# Patient Record
Sex: MALE | Race: BLACK OR AFRICAN AMERICAN | NOT HISPANIC OR LATINO | Employment: UNEMPLOYED | URBAN - METROPOLITAN AREA
[De-identification: names, ages, dates, MRNs, and addresses within clinical notes are randomized per-mention and may not be internally consistent; named-entity substitution may affect disease eponyms.]

---

## 2018-02-10 ENCOUNTER — HOSPITAL ENCOUNTER (INPATIENT)
Facility: HOSPITAL | Age: 43
LOS: 2 days | Discharge: HOME | DRG: 638 | End: 2018-02-12
Attending: HOSPITALIST | Admitting: HOSPITALIST
Payer: MEDICAID

## 2018-02-10 ENCOUNTER — HOSPITAL ENCOUNTER (EMERGENCY)
Facility: OTHER | Age: 43
Discharge: ANOTHER HOSPITAL | End: 2018-02-10
Attending: EMERGENCY MEDICINE
Payer: MEDICAID

## 2018-02-10 VITALS
BODY MASS INDEX: 36.1 KG/M2 | WEIGHT: 230 LBS | TEMPERATURE: 98 F | SYSTOLIC BLOOD PRESSURE: 161 MMHG | RESPIRATION RATE: 18 BRPM | HEART RATE: 106 BPM | OXYGEN SATURATION: 98 % | DIASTOLIC BLOOD PRESSURE: 97 MMHG | HEIGHT: 67 IN

## 2018-02-10 DIAGNOSIS — E11.10 DKA (DIABETIC KETOACIDOSES): ICD-10-CM

## 2018-02-10 DIAGNOSIS — E11.9 DIABETES MELLITUS, NEW ONSET: ICD-10-CM

## 2018-02-10 DIAGNOSIS — R30.0 DYSURIA: ICD-10-CM

## 2018-02-10 DIAGNOSIS — E13.10 DIABETIC KETOACIDOSIS WITHOUT COMA ASSOCIATED WITH OTHER SPECIFIED DIABETES MELLITUS: Primary | ICD-10-CM

## 2018-02-10 DIAGNOSIS — E11.10: ICD-10-CM

## 2018-02-10 DIAGNOSIS — R10.9 ABDOMINAL PAIN: ICD-10-CM

## 2018-02-10 PROBLEM — E83.52 HYPERCALCEMIA: Status: ACTIVE | Noted: 2018-02-10

## 2018-02-10 PROBLEM — N17.9 AKI (ACUTE KIDNEY INJURY): Status: ACTIVE | Noted: 2018-02-10

## 2018-02-10 PROBLEM — F17.200 TOBACCO USE DISORDER: Status: ACTIVE | Noted: 2018-02-10

## 2018-02-10 LAB
ALBUMIN SERPL BCP-MCNC: 3 G/DL
ALBUMIN SERPL-MCNC: 4.4 G/DL (ref 3.3–5.5)
ALP SERPL-CCNC: 77 U/L
ALP SERPL-CCNC: 99 U/L (ref 42–141)
ALT SERPL W/O P-5'-P-CCNC: 21 U/L
ANION GAP SERPL CALC-SCNC: 10 MMOL/L
ANION GAP SERPL CALC-SCNC: 12 MMOL/L
AST SERPL-CCNC: 9 U/L
BASOPHILS # BLD AUTO: 0.04 K/UL
BASOPHILS NFR BLD: 0.5 %
BILIRUB DIRECT SERPL-MCNC: 0.2 MG/DL
BILIRUB SERPL-MCNC: 0.5 MG/DL
BILIRUB SERPL-MCNC: 1 MG/DL (ref 0.2–1.6)
BILIRUBIN, POC UA: ABNORMAL
BLOOD, POC UA: ABNORMAL
BUN SERPL-MCNC: 12 MG/DL
BUN SERPL-MCNC: 15 MG/DL
BUN SERPL-MCNC: 18 MG/DL (ref 7–22)
CALCIUM SERPL-MCNC: 10.6 MG/DL (ref 8–10.3)
CALCIUM SERPL-MCNC: 8 MG/DL
CALCIUM SERPL-MCNC: 8.1 MG/DL
CHLORIDE SERPL-SCNC: 106 MMOL/L
CHLORIDE SERPL-SCNC: 107 MMOL/L
CHLORIDE SERPL-SCNC: 90 MMOL/L (ref 98–108)
CHOLEST SERPL-MCNC: 215 MG/DL
CHOLEST/HDLC SERPL: 8 {RATIO}
CLARITY, POC UA: CLEAR
CO2 SERPL-SCNC: 17 MMOL/L
CO2 SERPL-SCNC: 19 MMOL/L
COLOR, POC UA: YELLOW
CREAT SERPL-MCNC: 1.1 MG/DL
CREAT SERPL-MCNC: 1.2 MG/DL
CREAT SERPL-MCNC: 1.3 MG/DL (ref 0.6–1.2)
DIFFERENTIAL METHOD: ABNORMAL
EOSINOPHIL # BLD AUTO: 0 K/UL
EOSINOPHIL NFR BLD: 0.5 %
ERYTHROCYTE [DISTWIDTH] IN BLOOD BY AUTOMATED COUNT: 12.1 %
EST. GFR  (AFRICAN AMERICAN): >60 ML/MIN/1.73 M^2
EST. GFR  (AFRICAN AMERICAN): >60 ML/MIN/1.73 M^2
EST. GFR  (NON AFRICAN AMERICAN): >60 ML/MIN/1.73 M^2
EST. GFR  (NON AFRICAN AMERICAN): >60 ML/MIN/1.73 M^2
GLUCOSE SERPL-MCNC: 200 MG/DL
GLUCOSE SERPL-MCNC: 243 MG/DL
GLUCOSE SERPL-MCNC: 432 MG/DL (ref 73–118)
GLUCOSE, POC UA: ABNORMAL
HCO3 UR-SCNC: 21 MMOL/L (ref 24–28)
HCT VFR BLD AUTO: 38.3 %
HDLC SERPL-MCNC: 27 MG/DL
HDLC SERPL: 12.6 %
HGB BLD-MCNC: 13.5 G/DL
KETONES, POC UA: ABNORMAL
LDH SERPL L TO P-CCNC: 3.22 MMOL/L (ref 0.5–2.2)
LDLC SERPL CALC-MCNC: ABNORMAL MG/DL
LEUKOCYTE EST, POC UA: NEGATIVE
LYMPHOCYTES # BLD AUTO: 2.2 K/UL
LYMPHOCYTES NFR BLD: 29.1 %
MAGNESIUM SERPL-MCNC: 1.5 MG/DL
MCH RBC QN AUTO: 30.9 PG
MCHC RBC AUTO-ENTMCNC: 35.2 G/DL
MCV RBC AUTO: 88 FL
MONOCYTES # BLD AUTO: 0.6 K/UL
MONOCYTES NFR BLD: 7.5 %
NEUTROPHILS # BLD AUTO: 4.6 K/UL
NEUTROPHILS NFR BLD: 62 %
NITRITE, POC UA: NEGATIVE
NONHDLC SERPL-MCNC: 188 MG/DL
PCO2 BLDA: 44.2 MMHG (ref 35–45)
PH SMN: 7.28 [PH] (ref 7.35–7.45)
PH UR STRIP: 5 [PH]
PHOSPHATE SERPL-MCNC: 2.6 MG/DL
PLATELET # BLD AUTO: 262 K/UL
PMV BLD AUTO: 10.4 FL
PO2 BLDA: 32 MMHG (ref 40–60)
POC ALT (SGPT): 35 U/L (ref 10–47)
POC AST (SGOT): 22 U/L (ref 11–38)
POC BE: -6 MMOL/L
POC CARDIAC TROPONIN I: 0 NG/ML
POC CARDIAC TROPONIN I: 0.01 NG/ML
POC SATURATED O2: 53 % (ref 95–100)
POC TCO2: 20 MMOL/L (ref 18–33)
POC TCO2: 22 MMOL/L (ref 24–29)
POCT GLUCOSE: 194 MG/DL (ref 70–110)
POCT GLUCOSE: 224 MG/DL (ref 70–110)
POCT GLUCOSE: 237 MG/DL (ref 70–110)
POCT GLUCOSE: 245 MG/DL (ref 70–110)
POCT GLUCOSE: 247 MG/DL (ref 70–110)
POCT GLUCOSE: 251 MG/DL (ref 70–110)
POCT GLUCOSE: 253 MG/DL (ref 70–110)
POCT GLUCOSE: 281 MG/DL (ref 70–110)
POTASSIUM BLD-SCNC: 4.4 MMOL/L (ref 3.6–5.1)
POTASSIUM SERPL-SCNC: 3.8 MMOL/L
POTASSIUM SERPL-SCNC: 4.2 MMOL/L
PROT SERPL-MCNC: 5.9 G/DL
PROTEIN, POC UA: ABNORMAL
PROTEIN, POC: 8.8 G/DL (ref 6.4–8.1)
RBC # BLD AUTO: 4.37 M/UL
SAMPLE: ABNORMAL
SAMPLE: NORMAL
SAMPLE: NORMAL
SODIUM BLD-SCNC: 142 MMOL/L (ref 128–145)
SODIUM SERPL-SCNC: 135 MMOL/L
SODIUM SERPL-SCNC: 136 MMOL/L
SPECIFIC GRAVITY, POC UA: 1.02
TRIGL SERPL-MCNC: 572 MG/DL
TSH SERPL DL<=0.005 MIU/L-ACNC: 0.63 UIU/ML
UROBILINOGEN, POC UA: 0.2 E.U./DL
WBC # BLD AUTO: 7.42 K/UL

## 2018-02-10 PROCEDURE — 20000000 HC ICU ROOM

## 2018-02-10 PROCEDURE — 93005 ELECTROCARDIOGRAM TRACING: CPT

## 2018-02-10 PROCEDURE — 80061 LIPID PANEL: CPT

## 2018-02-10 PROCEDURE — 25000003 PHARM REV CODE 250: Performed by: HOSPITALIST

## 2018-02-10 PROCEDURE — 82803 BLOOD GASES ANY COMBINATION: CPT

## 2018-02-10 PROCEDURE — 90686 IIV4 VACC NO PRSV 0.5 ML IM: CPT | Performed by: HOSPITALIST

## 2018-02-10 PROCEDURE — 83036 HEMOGLOBIN GLYCOSYLATED A1C: CPT

## 2018-02-10 PROCEDURE — 83735 ASSAY OF MAGNESIUM: CPT

## 2018-02-10 PROCEDURE — S4991 NICOTINE PATCH NONLEGEND: HCPCS | Performed by: HOSPITALIST

## 2018-02-10 PROCEDURE — 63600175 PHARM REV CODE 636 W HCPCS: Performed by: HOSPITALIST

## 2018-02-10 PROCEDURE — 84443 ASSAY THYROID STIM HORMONE: CPT

## 2018-02-10 PROCEDURE — 25000003 PHARM REV CODE 250: Performed by: INTERNAL MEDICINE

## 2018-02-10 PROCEDURE — 96375 TX/PRO/DX INJ NEW DRUG ADDON: CPT

## 2018-02-10 PROCEDURE — 25000003 PHARM REV CODE 250: Performed by: EMERGENCY MEDICINE

## 2018-02-10 PROCEDURE — 90472 IMMUNIZATION ADMIN EACH ADD: CPT | Performed by: HOSPITALIST

## 2018-02-10 PROCEDURE — 84100 ASSAY OF PHOSPHORUS: CPT

## 2018-02-10 PROCEDURE — 96361 HYDRATE IV INFUSION ADD-ON: CPT

## 2018-02-10 PROCEDURE — 90471 IMMUNIZATION ADMIN: CPT | Performed by: HOSPITALIST

## 2018-02-10 PROCEDURE — 87086 URINE CULTURE/COLONY COUNT: CPT

## 2018-02-10 PROCEDURE — 96374 THER/PROPH/DIAG INJ IV PUSH: CPT

## 2018-02-10 PROCEDURE — 83036 HEMOGLOBIN GLYCOSYLATED A1C: CPT | Mod: 91

## 2018-02-10 PROCEDURE — 93010 ELECTROCARDIOGRAM REPORT: CPT | Mod: ,,, | Performed by: INTERNAL MEDICINE

## 2018-02-10 PROCEDURE — 85025 COMPLETE CBC W/AUTO DIFF WBC: CPT

## 2018-02-10 PROCEDURE — 96365 THER/PROPH/DIAG IV INF INIT: CPT

## 2018-02-10 PROCEDURE — S5010 5% DEXTROSE AND 0.45% SALINE: HCPCS | Performed by: INTERNAL MEDICINE

## 2018-02-10 PROCEDURE — 36415 COLL VENOUS BLD VENIPUNCTURE: CPT

## 2018-02-10 PROCEDURE — 80053 COMPREHEN METABOLIC PANEL: CPT

## 2018-02-10 PROCEDURE — 80048 BASIC METABOLIC PNL TOTAL CA: CPT | Mod: 91

## 2018-02-10 PROCEDURE — 84484 ASSAY OF TROPONIN QUANT: CPT

## 2018-02-10 PROCEDURE — 63600175 PHARM REV CODE 636 W HCPCS: Performed by: EMERGENCY MEDICINE

## 2018-02-10 PROCEDURE — 93010 ELECTROCARDIOGRAM REPORT: CPT | Mod: 77,,, | Performed by: INTERNAL MEDICINE

## 2018-02-10 PROCEDURE — 99291 CRITICAL CARE FIRST HOUR: CPT | Mod: 25

## 2018-02-10 PROCEDURE — 90670 PCV13 VACCINE IM: CPT | Performed by: HOSPITALIST

## 2018-02-10 PROCEDURE — 80076 HEPATIC FUNCTION PANEL: CPT

## 2018-02-10 PROCEDURE — 81003 URINALYSIS AUTO W/O SCOPE: CPT

## 2018-02-10 RX ORDER — IBUPROFEN 200 MG
1 TABLET ORAL DAILY
Status: DISCONTINUED | OUTPATIENT
Start: 2018-02-11 | End: 2018-02-10

## 2018-02-10 RX ORDER — SODIUM CHLORIDE 450 MG/100ML
1000 INJECTION, SOLUTION INTRAVENOUS CONTINUOUS
Status: DISCONTINUED | OUTPATIENT
Start: 2018-02-10 | End: 2018-02-10

## 2018-02-10 RX ORDER — DEXTROSE MONOHYDRATE 100 MG/ML
1000 INJECTION, SOLUTION INTRAVENOUS
Status: DISCONTINUED | OUTPATIENT
Start: 2018-02-10 | End: 2018-02-10 | Stop reason: HOSPADM

## 2018-02-10 RX ORDER — DEXTROSE 50 % IN WATER (D50W) INTRAVENOUS SYRINGE
12.5
Status: DISCONTINUED | OUTPATIENT
Start: 2018-02-10 | End: 2018-02-10

## 2018-02-10 RX ORDER — DEXTROSE MONOHYDRATE 100 MG/ML
1000 INJECTION, SOLUTION INTRAVENOUS
Status: DISCONTINUED | OUTPATIENT
Start: 2018-02-10 | End: 2018-02-12 | Stop reason: HOSPADM

## 2018-02-10 RX ORDER — IBUPROFEN 200 MG
1 TABLET ORAL DAILY
Status: DISCONTINUED | OUTPATIENT
Start: 2018-02-10 | End: 2018-02-12 | Stop reason: HOSPADM

## 2018-02-10 RX ORDER — ONDANSETRON 2 MG/ML
8 INJECTION INTRAMUSCULAR; INTRAVENOUS
Status: COMPLETED | OUTPATIENT
Start: 2018-02-10 | End: 2018-02-10

## 2018-02-10 RX ORDER — CEFTRIAXONE 1 G/1
1 INJECTION, POWDER, FOR SOLUTION INTRAMUSCULAR; INTRAVENOUS
Status: COMPLETED | OUTPATIENT
Start: 2018-02-10 | End: 2018-02-10

## 2018-02-10 RX ORDER — ACETAMINOPHEN 325 MG/1
650 TABLET ORAL EVERY 6 HOURS PRN
Status: DISCONTINUED | OUTPATIENT
Start: 2018-02-10 | End: 2018-02-12 | Stop reason: HOSPADM

## 2018-02-10 RX ORDER — DEXTROSE MONOHYDRATE AND SODIUM CHLORIDE 5; .45 G/100ML; G/100ML
INJECTION, SOLUTION INTRAVENOUS CONTINUOUS
Status: DISCONTINUED | OUTPATIENT
Start: 2018-02-10 | End: 2018-02-11

## 2018-02-10 RX ORDER — DEXTROSE 50 % IN WATER (D50W) INTRAVENOUS SYRINGE
25
Status: DISCONTINUED | OUTPATIENT
Start: 2018-02-10 | End: 2018-02-10

## 2018-02-10 RX ORDER — HEPARIN SODIUM 5000 [USP'U]/ML
5000 INJECTION, SOLUTION INTRAVENOUS; SUBCUTANEOUS EVERY 8 HOURS
Status: DISCONTINUED | OUTPATIENT
Start: 2018-02-10 | End: 2018-02-12 | Stop reason: HOSPADM

## 2018-02-10 RX ORDER — SODIUM CHLORIDE 450 MG/100ML
1000 INJECTION, SOLUTION INTRAVENOUS CONTINUOUS
Status: DISCONTINUED | OUTPATIENT
Start: 2018-02-10 | End: 2018-02-10 | Stop reason: HOSPADM

## 2018-02-10 RX ORDER — SODIUM CHLORIDE 0.9 % (FLUSH) 0.9 %
5 SYRINGE (ML) INJECTION
Status: DISCONTINUED | OUTPATIENT
Start: 2018-02-10 | End: 2018-02-12 | Stop reason: HOSPADM

## 2018-02-10 RX ORDER — ONDANSETRON 2 MG/ML
4 INJECTION INTRAMUSCULAR; INTRAVENOUS EVERY 8 HOURS PRN
Status: DISCONTINUED | OUTPATIENT
Start: 2018-02-10 | End: 2018-02-12 | Stop reason: HOSPADM

## 2018-02-10 RX ORDER — SODIUM CHLORIDE 0.9 % (FLUSH) 0.9 %
5 SYRINGE (ML) INJECTION
Status: DISCONTINUED | OUTPATIENT
Start: 2018-02-10 | End: 2018-02-10 | Stop reason: HOSPADM

## 2018-02-10 RX ORDER — KETOROLAC TROMETHAMINE 30 MG/ML
15 INJECTION, SOLUTION INTRAMUSCULAR; INTRAVENOUS
Status: COMPLETED | OUTPATIENT
Start: 2018-02-10 | End: 2018-02-10

## 2018-02-10 RX ORDER — HEPARIN SODIUM 5000 [USP'U]/ML
5000 INJECTION, SOLUTION INTRAVENOUS; SUBCUTANEOUS EVERY 8 HOURS
Status: DISCONTINUED | OUTPATIENT
Start: 2018-02-10 | End: 2018-02-10 | Stop reason: HOSPADM

## 2018-02-10 RX ORDER — DICYCLOMINE HYDROCHLORIDE 10 MG/1
10 CAPSULE ORAL 4 TIMES DAILY PRN
Status: DISCONTINUED | OUTPATIENT
Start: 2018-02-10 | End: 2018-02-12 | Stop reason: HOSPADM

## 2018-02-10 RX ADMIN — KETOROLAC TROMETHAMINE 15 MG: 30 INJECTION, SOLUTION INTRAMUSCULAR at 12:02

## 2018-02-10 RX ADMIN — DEXTROSE AND SODIUM CHLORIDE: 5; .45 INJECTION, SOLUTION INTRAVENOUS at 08:02

## 2018-02-10 RX ADMIN — SODIUM CHLORIDE 1000 ML: 0.9 INJECTION, SOLUTION INTRAVENOUS at 12:02

## 2018-02-10 RX ADMIN — SODIUM CHLORIDE 1000 ML: 0.9 INJECTION, SOLUTION INTRAVENOUS at 04:02

## 2018-02-10 RX ADMIN — SODIUM CHLORIDE 6 UNITS/HR: 9 INJECTION, SOLUTION INTRAVENOUS at 02:02

## 2018-02-10 RX ADMIN — ONDANSETRON 8 MG: 2 INJECTION INTRAMUSCULAR; INTRAVENOUS at 12:02

## 2018-02-10 RX ADMIN — SODIUM CHLORIDE 4 UNITS/HR: 9 INJECTION, SOLUTION INTRAVENOUS at 06:02

## 2018-02-10 RX ADMIN — INSULIN HUMAN 10 UNITS: 100 INJECTION, SOLUTION PARENTERAL at 01:02

## 2018-02-10 RX ADMIN — CEFTRIAXONE SODIUM 1 G: 1 INJECTION, POWDER, FOR SOLUTION INTRAMUSCULAR; INTRAVENOUS at 12:02

## 2018-02-10 RX ADMIN — HEPARIN SODIUM 5000 UNITS: 5000 INJECTION, SOLUTION INTRAVENOUS; SUBCUTANEOUS at 10:02

## 2018-02-10 RX ADMIN — SODIUM CHLORIDE 1000 ML: 0.9 INJECTION, SOLUTION INTRAVENOUS at 02:02

## 2018-02-10 RX ADMIN — SODIUM CHLORIDE 1000 ML: 0.45 INJECTION, SOLUTION INTRAVENOUS at 05:02

## 2018-02-10 RX ADMIN — PNEUMOCOCCAL 13-VALENT CONJUGATE VACCINE 0.5 ML: 2.2; 2.2; 2.2; 2.2; 2.2; 4.4; 2.2; 2.2; 2.2; 2.2; 2.2; 2.2; 2.2 INJECTION, SUSPENSION INTRAMUSCULAR at 05:02

## 2018-02-10 RX ADMIN — ACETAMINOPHEN 650 MG: 325 TABLET, FILM COATED ORAL at 11:02

## 2018-02-10 RX ADMIN — NICOTINE 1 PATCH: 21 PATCH, EXTENDED RELEASE TRANSDERMAL at 04:02

## 2018-02-10 RX ADMIN — INFLUENZA A VIRUS A/MICHIGAN/45/2015 X-275 (H1N1) ANTIGEN (FORMALDEHYDE INACTIVATED), INFLUENZA A VIRUS A/HONG KONG/4801/2014 X-263B (H3N2) ANTIGEN (FORMALDEHYDE INACTIVATED), INFLUENZA B VIRUS B/PHUKET/3073/2013 ANTIGEN (FORMALDEHYDE INACTIVATED), AND INFLUENZA B VIRUS B/BRISBANE/60/2008 ANTIGEN (FORMALDEHYDE INACTIVATED) 0.5 ML: 15; 15; 15; 15 INJECTION, SUSPENSION INTRAMUSCULAR at 05:02

## 2018-02-10 RX ADMIN — DICYCLOMINE HYDROCHLORIDE 10 MG: 10 CAPSULE ORAL at 05:02

## 2018-02-10 NOTE — ED NOTES
Pt presents c/o approx 3-day hx of nausea, urinary frequency, and bilateral lower abdominal pain radiating to R flank. States pain is worse upon urination. Denies fever/chills/spontaneous vomiting.

## 2018-02-10 NOTE — SUBJECTIVE & OBJECTIVE
No past medical history on file.    No past surgical history on file.    Review of patient's allergies indicates:   Allergen Reactions    Iodinated contrast- oral and iv dye Hives       Current Facility-Administered Medications on File Prior to Encounter   Medication    [COMPLETED] cefTRIAXone injection 1 g    [COMPLETED] insulin regular injection 10 Units    [COMPLETED] ketorolac injection 15 mg    [COMPLETED] ondansetron injection 8 mg    [COMPLETED] sodium chloride 0.9% bolus 1,000 mL    [COMPLETED] sodium chloride 0.9% bolus 1,000 mL    [COMPLETED] sodium chloride 0.9% bolus 1,000 mL    [DISCONTINUED] 0.45% NaCl infusion    [DISCONTINUED] dextrose 10 % infusion    [DISCONTINUED] dextrose 10 % infusion    [DISCONTINUED] dextrose 50 % in water (D50W) injection 12.5 g    [DISCONTINUED] dextrose 50 % in water (D50W) injection 25 g    [DISCONTINUED] dextrose 50% injection 12.5 g    [DISCONTINUED] dextrose 50% injection 25 g    [DISCONTINUED] heparin (porcine) injection 5,000 Units    [DISCONTINUED] insulin regular (Humulin R) 100 Units in sodium chloride 0.9% 100 mL infusion    [DISCONTINUED] insulin regular (Humulin R) 100 Units in sodium chloride 0.9% 100 mL infusion    [DISCONTINUED] insulin regular (Humulin R) 100 Units in sodium chloride 0.9% 100 mL infusion    [DISCONTINUED] sodium chloride 0.9% flush 5 mL     Current Outpatient Prescriptions on File Prior to Encounter   Medication Sig    erythromycin (ROMYCIN) ophthalmic ointment 1cm ribbon to left lower eyelid 6 times a day for 7 days.    tramadol (ULTRAM) 50 mg tablet Take 1 tablet (50 mg total) by mouth every 6 (six) hours as needed for Pain.     Family History     Problem Relation (Age of Onset)    Cancer Mother    Diabetes Mother    Heart disease Mother    Hypertension Father        Social History Main Topics    Smoking status: Former Smoker     Packs/day: 0.50     Years: 23.00     Types: Cigarettes    Smokeless tobacco: Never Used       Comment: Pt states he smokes 1 pack of cigarettes per week.    Alcohol use No    Drug use: No    Sexual activity: Yes     Partners: Female     Birth control/ protection: Condom     Review of Systems   Constitutional: Negative for activity change, appetite change, chills, diaphoresis, fatigue and fever.   HENT: Negative for congestion, mouth sores, sinus pain, sinus pressure, sneezing, sore throat and trouble swallowing.    Eyes: Negative for visual disturbance.   Respiratory: Negative for cough, choking, chest tightness, shortness of breath, wheezing and stridor.    Cardiovascular: Negative for chest pain, palpitations and leg swelling.   Gastrointestinal: Negative for abdominal distention, abdominal pain, constipation, diarrhea, nausea and vomiting.   Endocrine: Positive for polydipsia and polyuria.   Genitourinary: Negative for difficulty urinating, discharge, dysuria, flank pain, frequency, genital sores, hematuria and urgency.   Musculoskeletal: Negative for arthralgias, back pain, joint swelling and myalgias.   Skin: Negative for rash and wound.   Neurological: Negative for dizziness, syncope, weakness, light-headedness, numbness and headaches.     Objective:     Vital Signs (Most Recent):    Vital Signs (24h Range):  Temp:  [98.1 °F (36.7 °C)-98.6 °F (37 °C)] 98.2 °F (36.8 °C)  Pulse:  [106-118] 106  Resp:  [15-18] 18  SpO2:  [97 %-99 %] 98 %  BP: (123-166)/(73-99) 161/97        There is no height or weight on file to calculate BMI.    Physical Exam   Constitutional: He is oriented to person, place, and time. He appears well-developed and well-nourished. No distress.   Obese man   HENT:   Head: Normocephalic and atraumatic.   Nose: Nose normal.   Mouth/Throat: No oropharyngeal exudate.   Mouth dry   Eyes: Conjunctivae and EOM are normal. No scleral icterus.   Neck: Neck supple. No JVD present.   Cardiovascular: Intact distal pulses.  Exam reveals no gallop and no friction rub.    No murmur  heard.  tachycardic   Pulmonary/Chest: Effort normal and breath sounds normal. No respiratory distress. He has no wheezes. He has no rales.   Abdominal: Soft. Bowel sounds are normal. He exhibits no distension and no mass. There is no tenderness. There is no guarding.   Musculoskeletal: He exhibits no edema, tenderness or deformity.   Neurological: He is alert and oriented to person, place, and time. No cranial nerve deficit or sensory deficit.   Skin: Skin is warm and dry. No rash noted. He is not diaphoretic. No pallor.         CRANIAL NERVES     CN III, IV, VI   Extraocular motions are normal.        Significant Labs: All pertinent labs within the past 24 hours have been reviewed.    Significant Imaging: I have reviewed and interpreted all pertinent imaging results/findings within the past 24 hours.

## 2018-02-10 NOTE — H&P
"[facility] Medicine  History & Physical    Patient Name: Archana Covington  MRN: [id number]  Admission Date: 2/10/2018  Attending Physician: Mary Marmolejo MD   Primary Care Provider: Primary Doctor No         Patient information was obtained from patient, past medical records and ER records.     Subjective:     Principal Problem:Diabetes mellitus with ketoacidosis without coma    Chief Complaint:   Chief Complaint   Patient presents with    Diabetic Ketoacidosis        HPI: Mr. Archana Covington is a 42 y.o. man who presents from [facility] ED for DKA. Presented there with "not feeling good," polyuria, polydipsia that has been going on for "a while." No known medical diagnoses but does not see MD/PCP. No specific complaints.     No past medical history on file.-- Patient denies any past medical problems    No past surgical history on file.-- Patient has never had surgery    Review of patient's allergies indicates:   Allergen Reactions    Iodinated contrast- oral and iv dye Hives       Current Facility-Administered Medications on File Prior to Encounter   Medication    [COMPLETED] cefTRIAXone injection 1 g    [COMPLETED] insulin regular injection 10 Units    [COMPLETED] ketorolac injection 15 mg    [COMPLETED] ondansetron injection 8 mg    [COMPLETED] sodium chloride 0.9% bolus 1,000 mL    [COMPLETED] sodium chloride 0.9% bolus 1,000 mL    [COMPLETED] sodium chloride 0.9% bolus 1,000 mL    [DISCONTINUED] 0.45% NaCl infusion    [DISCONTINUED] dextrose 10 % infusion    [DISCONTINUED] dextrose 10 % infusion    [DISCONTINUED] dextrose 50 % in water (D50W) injection 12.5 g    [DISCONTINUED] dextrose 50 % in water (D50W) injection 25 g    [DISCONTINUED] dextrose 50% injection 12.5 g    [DISCONTINUED] dextrose 50% injection 25 g    [DISCONTINUED] heparin (porcine) injection 5,000 Units    [DISCONTINUED] insulin regular (Humulin R) 100 Units in sodium chloride 0.9% 100 " mL infusion    [DISCONTINUED] insulin regular (Humulin R) 100 Units in sodium chloride 0.9% 100 mL infusion    [DISCONTINUED] insulin regular (Humulin R) 100 Units in sodium chloride 0.9% 100 mL infusion    [DISCONTINUED] sodium chloride 0.9% flush 5 mL     Current Outpatient Prescriptions on File Prior to Encounter   Medication Sig    erythromycin (ROMYCIN) ophthalmic ointment 1cm ribbon to left lower eyelid 6 times a day for 7 days.    tramadol (ULTRAM) 50 mg tablet Take 1 tablet (50 mg total) by mouth every 6 (six) hours as needed for Pain.   --> patient denies taking any medications at home    Family History     Problem Relation (Age of Onset)    Cancer Mother    Diabetes Mother    Heart disease Mother    Hypertension Father        Social History Main Topics    Smoking status: Former Smoker     Packs/day: 0.50     Years: 23.00     Types: Cigarettes    Smokeless tobacco: Never Used      Comment: Pt states he smokes 1 pack of cigarettes per week.    Alcohol use No    Drug use: No    Sexual activity: Yes     Partners: Female     Birth control/ protection: Condom     Review of Systems   Constitutional: Negative for activity change, appetite change, chills, diaphoresis, fatigue and fever.   HENT: Negative for congestion, mouth sores, sinus pain, sinus pressure, sneezing, sore throat and trouble swallowing.    Eyes: Negative for visual disturbance.   Respiratory: Negative for cough, choking, chest tightness, shortness of breath, wheezing and stridor.    Cardiovascular: Negative for chest pain, palpitations and leg swelling.   Gastrointestinal: Negative for abdominal distention, abdominal pain, constipation, diarrhea, nausea and vomiting.   Endocrine: Positive for polydipsia and polyuria.   Genitourinary: Negative for difficulty urinating, discharge, dysuria, flank pain, frequency, genital sores, hematuria and urgency.   Musculoskeletal: Negative for arthralgias, back pain, joint swelling and myalgias.    Skin: Negative for rash and wound.   Neurological: Negative for dizziness, syncope, weakness, light-headedness, numbness and headaches.     Objective:     Vital Signs (Most Recent):    Vital Signs (24h Range):  Temp:  [98.1 °F (36.7 °C)-98.6 °F (37 °C)] 98.2 °F (36.8 °C)  Pulse:  [106-118] 106  Resp:  [15-18] 18  SpO2:  [97 %-99 %] 98 %  BP: (123-166)/(73-99) 161/97        There is no height or weight on file to calculate BMI.    Physical Exam   Constitutional: He is oriented to person, place, and time. He appears well-developed and well-nourished. No distress.   Obese man   HENT:   Head: Normocephalic and atraumatic.   Nose: Nose normal.   Mouth/Throat: No oropharyngeal exudate.   Mouth dry   Eyes: Conjunctivae and EOM are normal. No scleral icterus.   Neck: Neck supple. No JVD present.   Cardiovascular: Intact distal pulses.  Exam reveals no gallop and no friction rub.    No murmur heard.  tachycardic   Pulmonary/Chest: Effort normal and breath sounds normal. No respiratory distress. He has no wheezes. He has no rales.   Abdominal: Soft. Bowel sounds are normal. He exhibits no distension and no mass. There is no tenderness. There is no guarding.   Musculoskeletal: He exhibits no edema, tenderness or deformity.   Neurological: He is alert and oriented to person, place, and time. No cranial nerve deficit or sensory deficit.   Skin: Skin is warm and dry. No rash noted. He is not diaphoretic. No pallor.         CRANIAL NERVES     CN III, IV, VI   Extraocular motions are normal.        Significant Labs: All pertinent labs within the past 24 hours have been reviewed.    Significant Imaging: I have reviewed and interpreted all pertinent imaging results/findings within the past 24 hours.    Assessment/Plan:     * Diabetes mellitus with ketoacidosis without coma    - admitted with pH 7.2, bicarb 21, AG 32, UA + glucose and ketones, glucose 432  - no signs or symptoms of infection. Received ceftriaxone at ED but will  discontinue  - appears hypovolemic at this time- give NS 1L bolus  - start 1/2 NS at 250, insulin gtt  - Q1 hour accuchecks  - Q4 hour BMPs  - supplement K as needed per protocol  - check Phos  - check A1c  - hypoglycemic protocol  - patient will need to be transitioned to insulin after- will need to choose which kind based off insurance  - will need to be linked to PCP for DM care  - check lipids        Tobacco use disorder    - current 1 ppd smoker  - seems willing to quit  - nicotine patch 21mg  - link to smoking cessation at discharge        DKA (diabetic ketoacidoses)    - see above          SARIAH (acute kidney injury)    - Cr 1.3 at Ed, no prior to compare, but this likely represents a prerenal SARIAH due to dehydration  - IVF as below  - monitor          Hypercalcemia    - Ca 10.6 in ED  - likely secondary to dehydration  - IVF as per DKA protocol  - continue to monitor            VTE Risk Mitigation         Ordered     heparin (porcine) injection 5,000 Units  Every 8 hours     Route:  Subcutaneous        02/10/18 1638     Medium Risk of VTE  Once      02/10/18 1638        Critical care time spent on the evaluation and treatment of severe organ dysfunction, review of pertinent labs and imaging studies, discussions with consulting providers and discussions with patient/family: 45 minutes.     Mary Marmolejo MD  Department of Hospital Medicine   [facility]

## 2018-02-10 NOTE — ED NOTES
Per Jean[facility]ie on arrival, can not take pt d/t not having equipment, Jeanmarie reports that they dont know when pump can be returned and contacted dispatch PTA about equipment and was still sent to location.  At this time, pt is in NAD, nurse at bs.

## 2018-02-10 NOTE — HPI
"Mr. Archana Covington is a 42 y.o. man who presents from [facility] ED for DKA. Presented there with "not feeling good," polyuria, polydipsia that has been going on for "a while." No known medical diagnoses but does not see MD/PCP. No specific complaints.   "

## 2018-02-10 NOTE — ED PROVIDER NOTES
Encounter Date: 2/10/2018       History     Chief Complaint   Patient presents with    Abdominal Pain     pt presents to ED with c/o diffuse abd pain that started a couple of days ago. Denies any v/d at this time.     Archana Covington is a 42 y.o. male who presents to the Emergency Department with  pain with urination, urinating a lot, drinking a lot.  Patient's taken nothing for symptoms.  Patient states he has been peeing a lot for the last 3 days.  He's had sharp pains on-and-off in the lower belly and a generalized abdominal cramping.  All symptoms present for at least 3 days.  Patient's wife is at bedside states that he has been drinking a gallon of water a day.  Patient does smoke cigarettes.  Patient denies any past medical history.    Patient reports a strong family history of diabetes and high blood pressure.          Review of patient's allergies indicates:   Allergen Reactions    Iodinated contrast- oral and iv dye Hives     History reviewed. No pertinent past medical history.  History reviewed. No pertinent surgical history.  Family History   Problem Relation Age of Onset    Cancer Mother     Heart disease Mother     Diabetes Mother     Hypertension Father      Social History   Substance Use Topics    Smoking status: Former Smoker     Packs/day: 0.50     Years: 23.00     Types: Cigarettes    Smokeless tobacco: Never Used      Comment: Pt states he smokes 1 pack of cigarettes per week.    Alcohol use No     Review of Systems   Constitutional: Negative for fever.   HENT: Negative for sore throat.    Respiratory: Negative for shortness of breath.    Cardiovascular: Negative for chest pain.   Gastrointestinal: Positive for abdominal pain. Negative for nausea.   Endocrine: Positive for polydipsia and polyuria.   Genitourinary: Positive for dysuria.   Musculoskeletal: Negative for back pain.   Skin: Negative for rash.   Neurological: Negative for weakness.   Hematological: Does not bruise/bleed  easily.   All other systems reviewed and are negative.      Physical Exam     Initial Vitals [02/10/18 1047]   BP Pulse Resp Temp SpO2   (!) 136/93 (!) 118 16 98.6 °F (37 °C) 97 %      MAP       107.33         Physical Exam    Nursing note and vitals reviewed.  Constitutional: He appears well-developed and well-nourished. He is not diaphoretic. He is Obese . No distress.   HENT:   Head: Normocephalic and atraumatic.   Right Ear: External ear normal.   Left Ear: External ear normal.   Nose: Nose normal.   Mouth/Throat: Uvula is midline. Mucous membranes are dry.   Eyes: EOM are normal. Pupils are equal, round, and reactive to light. Right eye exhibits no discharge. Left eye exhibits no discharge.   Neck: Normal range of motion. Neck supple. JVD present.   Cardiovascular: Normal rate, regular rhythm, normal heart sounds and intact distal pulses. Exam reveals no gallop and no friction rub.    No murmur heard.  Pulmonary/Chest: Breath sounds normal. No respiratory distress. He has no wheezes. He has no rhonchi. He has no rales. He exhibits no tenderness.   Abdominal: Soft. Bowel sounds are normal. He exhibits no distension and no mass. There is tenderness in the suprapubic area. There is no rebound and no guarding.   Musculoskeletal: Normal range of motion. He exhibits no edema or tenderness.   Neurological: He is alert and oriented to person, place, and time. He has normal strength. No cranial nerve deficit or sensory deficit.   Skin: Skin is warm. Capillary refill takes less than 2 seconds. No rash noted. No pallor.   Psychiatric: He has a normal mood and affect.         ED Course   Critical Care  Date/Time: 2/10/2018 1:32 PM  Performed by: AUGIE MCDONALD  Authorized by: AUGIE MCDONALD   Direct patient critical care time: 12 minutes  Additional history critical care time: 10 minutes  Ordering / reviewing critical care time: 10 minutes  Documentation critical care time: 10 minutes  Consulting other physicians critical care  time: 10 minutes  Total critical care time (exclusive of procedural time) : 52 minutes  Critical care was necessary to treat or prevent imminent or life-threatening deterioration of the following conditions: metabolic crisis, dehydration, endocrine crisis and renal failure.  Critical care was time spent personally by me on the following activities: evaluation of patient's response to treatment, examination of patient, obtaining history from patient or surrogate, ordering and performing treatments and interventions, ordering and review of laboratory studies, ordering and review of radiographic studies, pulse oximetry and re-evaluation of patient's condition.        Labs Reviewed   POCT URINALYSIS W/O SCOPE - Abnormal; Notable for the following:        Result Value    Glucose, UA 2+ (*)     Bilirubin, UA 1+ (*)     Ketones, UA 4+ (*)     Blood, UA 3+ (*)     Protein, UA 3+ (*)     Nitrite, UA Negative (*)     Leukocytes, UA Negative (*)     All other components within normal limits   ISTAT PROCEDURE - Abnormal; Notable for the following:     POC PH 7.284 (*)     POC PO2 32 (*)     POC HCO3 21.0 (*)     POC SATURATED O2 53 (*)     POC Lactate 3.22 (*)     POC TCO2 22 (*)     All other components within normal limits   POCT CMP - Abnormal; Notable for the following:     Calcium, POC 10.6 (*)     POC Chloride 90 (*)     POC Creatinine 1.3 (*)     POC Glucose 432 (*)     Protein 8.8 (*)     All other components within normal limits   CULTURE, URINE   HEMOGLOBIN A1C   POCT URINALYSIS W/O SCOPE   POCT CBC   POCT GLUCOSE   POCT CMP   POCT GLUCOSE MONITORING CONTINUOUS   POCT TROPONIN     CBC White blood cell count 8.8, hemoglobin 17.2, hematocrit 53.6 and platelets 270  Lactic acid 3.22 pH 7.28 troponin 0.01 CMP sodium 142, bright potassium 4.4, bicarbonate 20, chloride 90, glucose 432, BUN 18, creatinine 1.3          EKG Readings: (Independently Interpreted)   Initial Reading: No STEMI. Rhythm: Normal Sinus Rhythm. Heart  Rate: 100. Axis: Left Axis Deviation. Other Impression: Abnormal EKG, LVH via criteria      Imaging Results          X-Ray Chest PA And Lateral (Final result)  Result time 02/10/18 13:47:31    Final result by Emilie Dias MD (02/10/18 13:47:31)                 Impression:     No acute cardiopulmonary process.          Electronically signed by: EMILIE DIAS MD  Date:     02/10/18  Time:    13:47              Narrative:    Chest PA and lateral    Indication:Hyperglycemia    Comparison:None    Findings:  The cardiomediastinal silhouette is not enlarged.  There is no pleural effusion.  The trachea is midline.  The lungs are symmetrically expanded bilaterally without evidence of acute parenchymal process. No large focal consolidation seen.  There is no pneumothorax.  There may be a calcified granuloma projected over the left lower lung zone laterally.  The osseous structures are unremarkable.                             CT Renal Stone Study ABD Pelvis WO (Final result)  Result time 02/10/18 12:25:30    Final result by Maggie Gallardo III, MD (02/10/18 12:25:30)                 Impression:     No abnormality seen.      Electronically signed by: MAGGIE GALLARDO MD  Date:     02/10/18  Time:    12:25              Narrative:    Findings: Lung bases are clear. Liver, gallbladder, biliary tree, spleen, stomach, pancreas, duodenum, and adrenal glands are unremarkable. Kidneys show nothing unusual. The ureters show nothing unusual. No obstruction, ileus, perforation, mass, or adenopathy seen. Bowel loops, right lower quadrant and appendix region are normal. No renal calculi are seen. Ureters are normal in course and caliber. No ureter or bladder calculi seen. Bones show nothing unusual.                                                   ED Course        Medical decision making   Chief complaint: Pain with urination, urinating a lot, drinking a lot.  Differential diagnosis: Retract infection, pyelonephritis, renal  stone, diabetes, DKA, hyperglycemia, hypoglycemia, hyperkalemia, and hypokalemia  Treatment in the ED Physical Exam, IV fluids, Toradol for pain, ceftriaxone for dysuria.  Patient reports resolution of pain after Toradol.  Patient with anion gap of 32.    Elevated creatinine 1.3, and glucose of 432.    Started patient on IV insulin drip.  Third bolus of normal saline administered  Patient's lactic acid is elevated at 3.22.    Patient's pH is 7.24.    UA with ketones, glucose, and protein.  Negative for leukocytes and nitrates  Discussed labs, and imaging results.    Patient and his wife are agreeable to admission at [facility] and  spoke with Fior at 13:26.   Requested admission to ICU at [facility] for DKA   Consultation with DR Marmolejo for admission.  Discussed patient's presentation, past medical history, physical exam, and ED course.  Also discussed vital signs and diagnosis is DKA.  At this time patient will be transferred & admitted.      At time of transfer patient is awake alert oriented ×4, speaking clearly in full sentences, moving all 4 extremities.  Clinical Impression:   The primary encounter diagnosis was Diabetic ketoacidosis without coma associated with other specified diabetes mellitus. Diagnoses of Abdominal pain, Dysuria, Diabetes mellitus, new onset, and DKA (diabetic ketoacidoses) were also pertinent to this visit.                           Dania Marquez DO  02/10/18 4927

## 2018-02-11 PROBLEM — E78.2 MIXED HYPERLIPIDEMIA: Status: ACTIVE | Noted: 2018-02-11

## 2018-02-11 LAB
ANION GAP SERPL CALC-SCNC: 7 MMOL/L
ANION GAP SERPL CALC-SCNC: 8 MMOL/L
ANION GAP SERPL CALC-SCNC: 8 MMOL/L
ANION GAP SERPL CALC-SCNC: 9 MMOL/L
BUN SERPL-MCNC: 11 MG/DL
BUN SERPL-MCNC: 11 MG/DL
BUN SERPL-MCNC: 7 MG/DL
BUN SERPL-MCNC: 9 MG/DL
CALCIUM SERPL-MCNC: 7.8 MG/DL
CALCIUM SERPL-MCNC: 7.9 MG/DL
CALCIUM SERPL-MCNC: 8 MG/DL
CALCIUM SERPL-MCNC: 8.2 MG/DL
CHLORIDE SERPL-SCNC: 105 MMOL/L
CHLORIDE SERPL-SCNC: 105 MMOL/L
CHLORIDE SERPL-SCNC: 106 MMOL/L
CHLORIDE SERPL-SCNC: 107 MMOL/L
CO2 SERPL-SCNC: 20 MMOL/L
CO2 SERPL-SCNC: 21 MMOL/L
CO2 SERPL-SCNC: 21 MMOL/L
CO2 SERPL-SCNC: 22 MMOL/L
CREAT SERPL-MCNC: 0.9 MG/DL
CREAT SERPL-MCNC: 1 MG/DL
CREAT SERPL-MCNC: 1.1 MG/DL
CREAT SERPL-MCNC: 1.1 MG/DL
EST. GFR  (AFRICAN AMERICAN): >60 ML/MIN/1.73 M^2
EST. GFR  (NON AFRICAN AMERICAN): >60 ML/MIN/1.73 M^2
ESTIMATED AVG GLUCOSE: 269 MG/DL
ESTIMATED AVG GLUCOSE: 275 MG/DL
GLUCOSE SERPL-MCNC: 202 MG/DL
GLUCOSE SERPL-MCNC: 234 MG/DL
GLUCOSE SERPL-MCNC: 253 MG/DL
GLUCOSE SERPL-MCNC: 298 MG/DL
HBA1C MFR BLD HPLC: 11 %
HBA1C MFR BLD HPLC: 11.2 %
POCT GLUCOSE: 176 MG/DL (ref 70–110)
POCT GLUCOSE: 191 MG/DL (ref 70–110)
POCT GLUCOSE: 193 MG/DL (ref 70–110)
POCT GLUCOSE: 207 MG/DL (ref 70–110)
POCT GLUCOSE: 216 MG/DL (ref 70–110)
POCT GLUCOSE: 222 MG/DL (ref 70–110)
POCT GLUCOSE: 234 MG/DL (ref 70–110)
POCT GLUCOSE: 251 MG/DL (ref 70–110)
POCT GLUCOSE: 251 MG/DL (ref 70–110)
POCT GLUCOSE: 254 MG/DL (ref 70–110)
POCT GLUCOSE: 274 MG/DL (ref 70–110)
POCT GLUCOSE: 276 MG/DL (ref 70–110)
POCT GLUCOSE: 334 MG/DL (ref 70–110)
POTASSIUM SERPL-SCNC: 3.1 MMOL/L
POTASSIUM SERPL-SCNC: 3.4 MMOL/L
POTASSIUM SERPL-SCNC: 3.6 MMOL/L
POTASSIUM SERPL-SCNC: 4.3 MMOL/L
SODIUM SERPL-SCNC: 134 MMOL/L
SODIUM SERPL-SCNC: 134 MMOL/L
SODIUM SERPL-SCNC: 135 MMOL/L
SODIUM SERPL-SCNC: 136 MMOL/L

## 2018-02-11 PROCEDURE — 25000003 PHARM REV CODE 250: Performed by: HOSPITALIST

## 2018-02-11 PROCEDURE — 97802 MEDICAL NUTRITION INDIV IN: CPT

## 2018-02-11 PROCEDURE — 25000003 PHARM REV CODE 250: Performed by: INTERNAL MEDICINE

## 2018-02-11 PROCEDURE — 63600175 PHARM REV CODE 636 W HCPCS: Performed by: HOSPITALIST

## 2018-02-11 PROCEDURE — S5010 5% DEXTROSE AND 0.45% SALINE: HCPCS | Performed by: INTERNAL MEDICINE

## 2018-02-11 PROCEDURE — 36415 COLL VENOUS BLD VENIPUNCTURE: CPT

## 2018-02-11 PROCEDURE — S4991 NICOTINE PATCH NONLEGEND: HCPCS | Performed by: HOSPITALIST

## 2018-02-11 PROCEDURE — 12000002 HC ACUTE/MED SURGE SEMI-PRIVATE ROOM

## 2018-02-11 PROCEDURE — 80048 BASIC METABOLIC PNL TOTAL CA: CPT | Mod: 91

## 2018-02-11 RX ORDER — INSULIN ASPART 100 [IU]/ML
0-5 INJECTION, SOLUTION INTRAVENOUS; SUBCUTANEOUS
Status: DISCONTINUED | OUTPATIENT
Start: 2018-02-11 | End: 2018-02-12 | Stop reason: HOSPADM

## 2018-02-11 RX ORDER — IBUPROFEN 200 MG
16 TABLET ORAL
Status: DISCONTINUED | OUTPATIENT
Start: 2018-02-11 | End: 2018-02-12 | Stop reason: HOSPADM

## 2018-02-11 RX ORDER — ASPIRIN 81 MG/1
81 TABLET ORAL DAILY
Status: DISCONTINUED | OUTPATIENT
Start: 2018-02-11 | End: 2018-02-12 | Stop reason: HOSPADM

## 2018-02-11 RX ORDER — ATORVASTATIN CALCIUM 40 MG/1
80 TABLET, FILM COATED ORAL DAILY
Status: DISCONTINUED | OUTPATIENT
Start: 2018-02-11 | End: 2018-02-12 | Stop reason: HOSPADM

## 2018-02-11 RX ORDER — GLUCAGON 1 MG
1 KIT INJECTION
Status: DISCONTINUED | OUTPATIENT
Start: 2018-02-11 | End: 2018-02-12 | Stop reason: HOSPADM

## 2018-02-11 RX ORDER — POTASSIUM CHLORIDE 20 MEQ/1
40 TABLET, EXTENDED RELEASE ORAL ONCE
Status: COMPLETED | OUTPATIENT
Start: 2018-02-11 | End: 2018-02-11

## 2018-02-11 RX ORDER — IBUPROFEN 200 MG
24 TABLET ORAL
Status: DISCONTINUED | OUTPATIENT
Start: 2018-02-11 | End: 2018-02-12 | Stop reason: HOSPADM

## 2018-02-11 RX ADMIN — INSULIN ASPART 2 UNITS: 100 INJECTION, SOLUTION INTRAVENOUS; SUBCUTANEOUS at 09:02

## 2018-02-11 RX ADMIN — HEPARIN SODIUM 5000 UNITS: 5000 INJECTION, SOLUTION INTRAVENOUS; SUBCUTANEOUS at 02:02

## 2018-02-11 RX ADMIN — NICOTINE 1 PATCH: 21 PATCH, EXTENDED RELEASE TRANSDERMAL at 08:02

## 2018-02-11 RX ADMIN — HUMAN INSULIN 15 UNITS: 100 INJECTION, SUSPENSION SUBCUTANEOUS at 10:02

## 2018-02-11 RX ADMIN — ATORVASTATIN CALCIUM 80 MG: 40 TABLET, FILM COATED ORAL at 02:02

## 2018-02-11 RX ADMIN — HUMAN INSULIN 15 UNITS: 100 INJECTION, SUSPENSION SUBCUTANEOUS at 05:02

## 2018-02-11 RX ADMIN — POTASSIUM CHLORIDE 40 MEQ: 1500 TABLET, EXTENDED RELEASE ORAL at 10:02

## 2018-02-11 RX ADMIN — INSULIN ASPART 2 UNITS: 100 INJECTION, SOLUTION INTRAVENOUS; SUBCUTANEOUS at 05:02

## 2018-02-11 RX ADMIN — ASPIRIN 81 MG: 81 TABLET, COATED ORAL at 02:02

## 2018-02-11 RX ADMIN — INSULIN ASPART 3 UNITS: 100 INJECTION, SOLUTION INTRAVENOUS; SUBCUTANEOUS at 11:02

## 2018-02-11 RX ADMIN — HEPARIN SODIUM 5000 UNITS: 5000 INJECTION, SOLUTION INTRAVENOUS; SUBCUTANEOUS at 06:02

## 2018-02-11 RX ADMIN — HEPARIN SODIUM 5000 UNITS: 5000 INJECTION, SOLUTION INTRAVENOUS; SUBCUTANEOUS at 09:02

## 2018-02-11 RX ADMIN — DEXTROSE AND SODIUM CHLORIDE: 5; .45 INJECTION, SOLUTION INTRAVENOUS at 03:02

## 2018-02-11 NOTE — CONSULTS
"  [facility]  Adult Nutrition  Consult Note    SUMMARY     Recommendations    Recommendation/Intervention: 1. Continue 2000kcal DM diet 2. Provide DM diet education     Goals: 1. Meet >85% EEN and EPN 2. Promote gradual wt loss 3. Promote nutrition related labs wnl     Nutrition Goal Status: new  Communication of RD Recs: other (comment) (care plan)    Continuum of Care Plan         Reason for Assessment    Reason for Assessment: physician consult  Diagnosis:   1. DKA (diabetic ketoacidoses)    2. Diabetes mellitus with ketoacidosis without coma      History reviewed. No pertinent past medical history.             General Information Comments: Remote assessment completed. Pt with new DM, DKA.    Nutrition Discharge Planning: d/c on DM diet    Nutrition Prescription Ordered    Current Diet Order: 2000kcal DM                    Evaluation of Received Nutrients/Fluid Intake                                                                                                     % Intake of Estimated Energy Needs: Other: ITALO  % Meal Intake: Other: ITALO     Nutrition Risk Screen     Nutrition Risk Screen: no indicators present    Nutrition/Diet History          Food Preferences: No cultural/spiritual preferences noted                         Labs/Tests/Procedures/Meds       Pertinent Labs Reviewed: reviewed     Pertinent Medications Reviewed: reviewed       Physical Findings    Overall Physical Appearance: obese     Oral/Mouth Cavity: WDL  Skin: intact    Anthropometrics    Temp: 98.1 °F (36.7 °C)     Height: 5' 6" (167.6 cm)  Weight Method: Bed Scale  Weight: 121.3 kg (267 lb 6.7 oz)  Ideal Body Weight (IBW), Male: 142 lb     % Ideal Body Weight, Male (lb): 188.32 lb     BMI (Calculated): 43.3  BMI Grade: greater than 40 - morbid obesity                            Estimated/Assessed Needs    Weight Used For Calorie Calculations: 121.3 kg (267 lb 6.7 oz)      Energy Calorie Requirements (kcal): 1954  Energy " Need Method: Amite-St Jeor (stress factor 1.2 - 500kcal for wt loss)       RMR (Amite-St. Jeor Equation): 2045.75        Weight Used For Protein Calculations: 64.4 kg (142 lb) (IBW)  Protein Requirements:  gm/d (1.5-1.7 gm/kg IBW)    Fluid Requirements (mL): 1954 (or per team)  Fluid Need Method: RDA Method        RDA Method (mL): 1954               Assessment and Plan    * Diabetes mellitus with ketoacidosis without coma    Nutrition Diagnosis:  Altered nutrition related labs    Related to (etiology):   DM    Signs and Symptoms (as evidenced by):   Elevated fingersticks and DKA upon admission    Interventions/Recommendations (treatment strategy):  2000kcal DM diet    Nutrition Diagnosis Status:   New              Monitor and Evaluation    Food and Nutrient Intake: energy intake, food and beverage intake  Food and Nutrient Adminstration: diet order  Knowledge/Beliefs/Attitudes: food and nutrition knowledge/skill, beliefs and attitudes  Physical Activity and Function: nutrition-related ADLs and IADLs  Anthropometric Measurements: weight, weight change  Biochemical Data, Medical Tests and Procedures: electrolyte and renal panel, gastrointestinal profile, glucose/endocrine profile, inflammatory profile  Nutrition-Focused Physical Findings: overall appearance, extremities, muscles and bones, skin    Nutrition Risk    Level of Risk: other (see comments) (f/u 2x/wk)    Nutrition Follow-Up    RD Follow-up?: Yes

## 2018-02-11 NOTE — PLAN OF CARE
"Problem: Patient Care Overview  Goal: Plan of Care Review  Patient transferred from [facility] ED with new onset DM with DKA. Patient states that he has been feeling bad for "awhile". Insulin infusing at 4 units per hour, blood sugar 281 mg/dL. Sinus Tach on monitor. Mucous membranes dry. Denies CP, SOB, Nausea and Vomiting at present. TV w/ diabetes video placed at bedside. Patient agreed to flu and pneumonia vaccination.       "

## 2018-02-11 NOTE — PLAN OF CARE
Problem: Patient Care Overview  Goal: Individualization & Mutuality  Outcome: Ongoing (interventions implemented as appropriate)   02/11/18 1427   Individualization   Patient Specific Goals To be able to take control of my diabetes     Pt transferred to [facility] in stable condition via w/c with transporter.  Wife at side wit belongings.  NPH vial and Aspart insulin pen sent down to unit with transporter.  No s/s of distress noted.  Goal: Interdisciplinary Rounds/Family Conf  Outcome: Outcome(s) achieved Date Met: 02/11/18 02/11/18 1427   Interdisciplinary Rounds/Family Conf   Summary Diabetic educator       Problem: Diabetes, Type 2 (Adult)  Goal: Signs and Symptoms of Listed Potential Problems Will be Absent, Minimized or Managed (Diabetes, Type 2)  Signs and symptoms of listed potential problems will be absent, minimized or managed by discharge/transition of care (reference Diabetes, Type 2 (Adult) CPG).   Outcome: Ongoing (interventions implemented as appropriate)   02/11/18 1427   Diabetes, Type 2   Problems Assessed (Type 2 Diabetes) all   Problems Present (Type 2 Diabetes) hyperglycemia

## 2018-02-11 NOTE — PLAN OF CARE
Mr. Archana Covington is a 42 y.o. M with no prior PMH who presented with DKA and new diagnosis Dm. Transitioned off insulin gtt on 2/11 in AM and started NPH insulin 15mU BID with SSI. Also found to have hyperlipidemia- started asa and statin.    To do  - titrate insulin regimen  - insulin teaching  - patient needs supplies- glucometer, test strips, syringes, lancets, etc  - needs to be linked to PCP    aMry Marmolejo MD  [facility]  02/11/2018 1:57 PM

## 2018-02-11 NOTE — SUBJECTIVE & OBJECTIVE
Interval History: No complaints today.    Review of Systems   Eyes: Negative for visual disturbance.   Respiratory: Negative for apnea, cough, chest tightness, shortness of breath and wheezing.    Cardiovascular: Negative for chest pain, palpitations and leg swelling.   Gastrointestinal: Negative for abdominal pain, constipation, diarrhea, nausea and vomiting.     Objective:     Vital Signs (Most Recent):  Temp: 98.1 °F (36.7 °C) (02/11/18 0800)  Pulse: 83 (02/11/18 1300)  Resp: 17 (02/11/18 1300)  BP: 118/66 (02/11/18 1300)  SpO2: 98 % (02/11/18 1300) Vital Signs (24h Range):  Temp:  [98.1 °F (36.7 °C)-98.5 °F (36.9 °C)] 98.1 °F (36.7 °C)  Pulse:  [] 83  Resp:  [8-46] 17  SpO2:  [94 %-99 %] 98 %  BP: (118-166)/(65-97) 118/66     Weight: 121.3 kg (267 lb 6.7 oz)  Body mass index is 43.16 kg/m².    Intake/Output Summary (Last 24 hours) at 02/11/18 1347  Last data filed at 02/11/18 0900   Gross per 24 hour   Intake          2614.81 ml   Output                0 ml   Net          2614.81 ml      Physical Exam   Constitutional: He is oriented to person, place, and time. He appears well-developed. No distress.   HENT:   Head: Normocephalic and atraumatic.   Mouth/Throat: Oropharynx is clear and moist. No oropharyngeal exudate.   Eyes: EOM are normal. No scleral icterus.   Neck: Neck supple. No JVD present.   Cardiovascular: Normal rate, regular rhythm, normal heart sounds and intact distal pulses.  Exam reveals no friction rub.    No murmur heard.  Pulmonary/Chest: Effort normal and breath sounds normal. No respiratory distress. He has no wheezes. He has no rales.   Abdominal: Soft. Bowel sounds are normal. He exhibits no distension and no mass. There is no tenderness. There is no guarding.   Musculoskeletal: He exhibits no edema, tenderness or deformity.   Neurological: He is alert and oriented to person, place, and time.   Skin: Skin is warm and dry. He is not diaphoretic.       Significant Labs: All pertinent  labs within the past 24 hours have been reviewed.    Significant Imaging: I have reviewed and interpreted all pertinent imaging results/findings within the past 24 hours.

## 2018-02-11 NOTE — NURSING
Report called to [facility] update of pt to be transferred to  rm [id number].  Pt is AAO x4.  Wife is at bedside.  Insulin gtt d/c this morning.  Pt has not have any s/s of DKA at this time.  Last Cbg 251 mg/dl.  Pt was given 3  Units apart per s/s per orders.  Pt denies any pain or discomfort at this time.  Will continue to monitor.

## 2018-02-11 NOTE — PROGRESS NOTES
"[facility] Medicine  Progress Note    Patient Name: Archana Covington  MRN: [id number]  Patient Class: IP- Inpatient   Admission Date: 2/10/2018  Length of Stay: 1 days  Attending Physician: Mary Marmolejo MD  Primary Care Provider: Primary Doctor No        Subjective:     Principal Problem:Diabetes mellitus with ketoacidosis without coma    HPI:  Mr. Archana Covington is a 42 y.o. man who presents from [facility] ED for DKA. Presented there with "not feeling good," polyuria, polydipsia that has been going on for "a while." No known medical diagnoses but does not see MD/PCP. No specific complaints.     Hospital Course:  Admitted with DKA, new diagnosis Dm. Managed with IVF and insulin gtt. Transitioned to NPH insulin on 2/11 (chose NPH because patient has Medicaid). Transferred to floor for continued glucose monitoring and DM teaching.     Interval History: No complaints today.    Review of Systems   Eyes: Negative for visual disturbance.   Respiratory: Negative for apnea, cough, chest tightness, shortness of breath and wheezing.    Cardiovascular: Negative for chest pain, palpitations and leg swelling.   Gastrointestinal: Negative for abdominal pain, constipation, diarrhea, nausea and vomiting.     Objective:     Vital Signs (Most Recent):  Temp: 98.1 °F (36.7 °C) (02/11/18 0800)  Pulse: 83 (02/11/18 1300)  Resp: 17 (02/11/18 1300)  BP: 118/66 (02/11/18 1300)  SpO2: 98 % (02/11/18 1300) Vital Signs (24h Range):  Temp:  [98.1 °F (36.7 °C)-98.5 °F (36.9 °C)] 98.1 °F (36.7 °C)  Pulse:  [] 83  Resp:  [8-46] 17  SpO2:  [94 %-99 %] 98 %  BP: (118-166)/(65-97) 118/66     Weight: 121.3 kg (267 lb 6.7 oz)  Body mass index is 43.16 kg/m².    Intake/Output Summary (Last 24 hours) at 02/11/18 1347  Last data filed at 02/11/18 0900   Gross per 24 hour   Intake          2614.81 ml   Output                0 ml   Net          2614.81 ml      Physical Exam   Constitutional: He is oriented " to person, place, and time. He appears well-developed. No distress.   HENT:   Head: Normocephalic and atraumatic.   Mouth/Throat: Oropharynx is clear and moist. No oropharyngeal exudate.   Eyes: EOM are normal. No scleral icterus.   Neck: Neck supple. No JVD present.   Cardiovascular: Normal rate, regular rhythm, normal heart sounds and intact distal pulses.  Exam reveals no friction rub.    No murmur heard.  Pulmonary/Chest: Effort normal and breath sounds normal. No respiratory distress. He has no wheezes. He has no rales.   Abdominal: Soft. Bowel sounds are normal. He exhibits no distension and no mass. There is no tenderness. There is no guarding.   Musculoskeletal: He exhibits no edema, tenderness or deformity.   Neurological: He is alert and oriented to person, place, and time.   Skin: Skin is warm and dry. He is not diaphoretic.       Significant Labs: All pertinent labs within the past 24 hours have been reviewed.    Significant Imaging: I have reviewed and interpreted all pertinent imaging results/findings within the past 24 hours.    Assessment/Plan:      * Diabetes mellitus with ketoacidosis without coma    - admitted with pH 7.2, bicarb 21, AG 32, UA + glucose and ketones, glucose 432  - no signs or symptoms of infection. Received ceftriaxone at ED but will discontinue  - appears hypovolemic at admit- give NS 1L bolus  - start 1/2 NS at 250, insulin gtt  - Q1 hour accuchecks  - Q4 hour BMPs  - supplement K as needed per protocol  - hypoglycemic protocol  - A1c in process    2/11- transitioned to NPH insulin 15U BID + SSI  - will need to be linked to PCP for DM care  - start asa 81 and atorvastatin 40mg        Mixed hyperlipidemia    - , HDL 27, LDL invalid due to high Tgs,   - start atorvastatin 80mg daily  - no fibrate as Tgs are <886  - start asa 81          Tobacco use disorder    - current 1 ppd smoker  - seems willing to quit  - nicotine patch 21mg  - link to smoking cessation at  discharge        DKA (diabetic ketoacidoses)    - see above          SARIAH (acute kidney injury)    - Cr 1.3 at Ed, no prior to compare, but this likely represents a prerenal SARIAH due to dehydration  - IVF as below  - now improved to Cr 0.9          Hypercalcemia    - Ca 10.6 in ED  - likely secondary to dehydration  - IVF as per DKA protocol  - now corrected to normal            VTE Risk Mitigation         Ordered     heparin (porcine) injection 5,000 Units  Every 8 hours     Route:  Subcutaneous        02/10/18 1638     Medium Risk of VTE  Once      02/10/18 1638          Critical care time spent on the evaluation and treatment of severe organ dysfunction, review of pertinent labs and imaging studies, discussions with consulting providers and discussions with patient/family: 30 minutes.        Mary Marmolejo MD  Department of Hospital Medicine   [facility]

## 2018-02-11 NOTE — PLAN OF CARE
Problem: Patient Care Overview  Goal: Plan of Care Review  Outcome: Ongoing (interventions implemented as appropriate)  Recommendation/Intervention: 1. Continue 2000kcal DM diet 2. Provide DM diet education      Goals: 1. Meet >85% EEN and EPN 2. Promote gradual wt loss 3. Promote nutrition related labs wnl

## 2018-02-11 NOTE — PLAN OF CARE
Problem: Patient Care Overview  Goal: Individualization & Mutuality  Outcome: Ongoing (interventions implemented as appropriate)  Pt continues in ICU. Pt on insulin drip titrated according to algorithm. Pt NSR during shift. Pt afebrile during shift. AAOx4. No new injury or fall noted. Pt shows no signs or symptoms of distress.

## 2018-02-11 NOTE — HOSPITAL COURSE
Admitted with DKA, new diagnosis Dm. Managed with IVF and insulin gtt. Transitioned to NPH insulin on 2/11 (chose NPH because patient has Medicaid). Transferred to floor for continued glucose monitoring and DM teaching.     2/12- A1c 11%.  Pt feels comfortable with insulin management and given diabetes education. Pt seen by dietician as well. Insulin co-pay will be $0.  He will follow up with [facility] for diabetes monitoring and management .

## 2018-02-12 VITALS
OXYGEN SATURATION: 98 % | SYSTOLIC BLOOD PRESSURE: 134 MMHG | DIASTOLIC BLOOD PRESSURE: 82 MMHG | TEMPERATURE: 98 F | RESPIRATION RATE: 18 BRPM | HEIGHT: 66 IN | BODY MASS INDEX: 43.08 KG/M2 | WEIGHT: 268.06 LBS | HEART RATE: 83 BPM

## 2018-02-12 PROBLEM — E11.10 DKA (DIABETIC KETOACIDOSES): Status: RESOLVED | Noted: 2018-02-10 | Resolved: 2018-02-12

## 2018-02-12 PROBLEM — E66.01 MORBID OBESITY WITH BMI OF 40.0-44.9, ADULT: Status: ACTIVE | Noted: 2018-02-12

## 2018-02-12 PROBLEM — N17.9 AKI (ACUTE KIDNEY INJURY): Status: RESOLVED | Noted: 2018-02-10 | Resolved: 2018-02-12

## 2018-02-12 PROBLEM — E11.9 NEW ONSET TYPE 2 DIABETES MELLITUS: Status: ACTIVE | Noted: 2018-02-10

## 2018-02-12 PROBLEM — E83.52 HYPERCALCEMIA: Status: RESOLVED | Noted: 2018-02-10 | Resolved: 2018-02-12

## 2018-02-12 PROBLEM — E11.10: Status: RESOLVED | Noted: 2018-02-10 | Resolved: 2018-02-12

## 2018-02-12 LAB
ANION GAP SERPL CALC-SCNC: 8 MMOL/L
BACTERIA UR CULT: NORMAL
BASOPHILS # BLD AUTO: 0.02 K/UL
BASOPHILS NFR BLD: 0.4 %
BUN SERPL-MCNC: 8 MG/DL
CALCIUM SERPL-MCNC: 8.5 MG/DL
CHLORIDE SERPL-SCNC: 104 MMOL/L
CO2 SERPL-SCNC: 23 MMOL/L
CREAT SERPL-MCNC: 0.9 MG/DL
DIFFERENTIAL METHOD: ABNORMAL
EOSINOPHIL # BLD AUTO: 0.1 K/UL
EOSINOPHIL NFR BLD: 3 %
ERYTHROCYTE [DISTWIDTH] IN BLOOD BY AUTOMATED COUNT: 12.3 %
EST. GFR  (AFRICAN AMERICAN): >60 ML/MIN/1.73 M^2
EST. GFR  (NON AFRICAN AMERICAN): >60 ML/MIN/1.73 M^2
GLUCOSE SERPL-MCNC: 252 MG/DL
HCT VFR BLD AUTO: 39.3 %
HGB BLD-MCNC: 13.8 G/DL
LYMPHOCYTES # BLD AUTO: 2.3 K/UL
LYMPHOCYTES NFR BLD: 49.8 %
MAGNESIUM SERPL-MCNC: 1.9 MG/DL
MCH RBC QN AUTO: 31.1 PG
MCHC RBC AUTO-ENTMCNC: 35.1 G/DL
MCV RBC AUTO: 89 FL
MONOCYTES # BLD AUTO: 0.4 K/UL
MONOCYTES NFR BLD: 9.6 %
NEUTROPHILS # BLD AUTO: 1.7 K/UL
NEUTROPHILS NFR BLD: 37.2 %
PLATELET # BLD AUTO: 231 K/UL
PMV BLD AUTO: 10.3 FL
POCT GLUCOSE: 240 MG/DL (ref 70–110)
POCT GLUCOSE: 245 MG/DL (ref 70–110)
POCT GLUCOSE: 289 MG/DL (ref 70–110)
POTASSIUM SERPL-SCNC: 3.4 MMOL/L
RBC # BLD AUTO: 4.44 M/UL
SODIUM SERPL-SCNC: 135 MMOL/L
WBC # BLD AUTO: 4.6 K/UL

## 2018-02-12 PROCEDURE — 25000003 PHARM REV CODE 250: Performed by: HOSPITALIST

## 2018-02-12 PROCEDURE — 83735 ASSAY OF MAGNESIUM: CPT

## 2018-02-12 PROCEDURE — S4991 NICOTINE PATCH NONLEGEND: HCPCS | Performed by: HOSPITALIST

## 2018-02-12 PROCEDURE — 63600175 PHARM REV CODE 636 W HCPCS: Performed by: HOSPITALIST

## 2018-02-12 PROCEDURE — 80048 BASIC METABOLIC PNL TOTAL CA: CPT

## 2018-02-12 PROCEDURE — 36415 COLL VENOUS BLD VENIPUNCTURE: CPT

## 2018-02-12 PROCEDURE — 85025 COMPLETE CBC W/AUTO DIFF WBC: CPT

## 2018-02-12 RX ORDER — TRAMADOL HYDROCHLORIDE 50 MG/1
50 TABLET ORAL EVERY 6 HOURS PRN
Qty: 20 TABLET | Refills: 0 | Status: SHIPPED | OUTPATIENT
Start: 2018-02-12

## 2018-02-12 RX ORDER — IBUPROFEN 200 MG
1 TABLET ORAL DAILY
Refills: 0 | COMMUNITY
Start: 2018-02-13

## 2018-02-12 RX ORDER — INSULIN LISPRO 100 [IU]/ML
INJECTION, SOLUTION INTRAVENOUS; SUBCUTANEOUS
Qty: 3 VIAL | Refills: 0 | Status: SHIPPED | OUTPATIENT
Start: 2018-02-12 | End: 2018-03-11

## 2018-02-12 RX ORDER — ATORVASTATIN CALCIUM 80 MG/1
80 TABLET, FILM COATED ORAL DAILY
Qty: 90 TABLET | Refills: 3 | Status: SHIPPED | OUTPATIENT
Start: 2018-02-13 | End: 2018-02-12

## 2018-02-12 RX ORDER — ATORVASTATIN CALCIUM 80 MG/1
80 TABLET, FILM COATED ORAL DAILY
Qty: 90 TABLET | Refills: 3 | Status: SHIPPED | OUTPATIENT
Start: 2018-02-13 | End: 2019-02-13

## 2018-02-12 RX ORDER — ASPIRIN 81 MG/1
81 TABLET ORAL DAILY
Refills: 0 | COMMUNITY
Start: 2018-02-13 | End: 2019-02-13

## 2018-02-12 RX ORDER — TRAMADOL HYDROCHLORIDE 50 MG/1
50 TABLET ORAL EVERY 6 HOURS PRN
Qty: 20 TABLET | Refills: 0 | Status: SHIPPED | OUTPATIENT
Start: 2018-02-12 | End: 2018-02-12

## 2018-02-12 RX ADMIN — HUMAN INSULIN 25 UNITS: 100 INJECTION, SUSPENSION SUBCUTANEOUS at 05:02

## 2018-02-12 RX ADMIN — NICOTINE 1 PATCH: 21 PATCH, EXTENDED RELEASE TRANSDERMAL at 08:02

## 2018-02-12 RX ADMIN — ATORVASTATIN CALCIUM 80 MG: 40 TABLET, FILM COATED ORAL at 08:02

## 2018-02-12 RX ADMIN — HEPARIN SODIUM 5000 UNITS: 5000 INJECTION, SOLUTION INTRAVENOUS; SUBCUTANEOUS at 02:02

## 2018-02-12 RX ADMIN — INSULIN ASPART 2 UNITS: 100 INJECTION, SOLUTION INTRAVENOUS; SUBCUTANEOUS at 06:02

## 2018-02-12 RX ADMIN — INSULIN ASPART 3 UNITS: 100 INJECTION, SOLUTION INTRAVENOUS; SUBCUTANEOUS at 12:02

## 2018-02-12 RX ADMIN — ASPIRIN 81 MG: 81 TABLET, COATED ORAL at 08:02

## 2018-02-12 RX ADMIN — HEPARIN SODIUM 5000 UNITS: 5000 INJECTION, SOLUTION INTRAVENOUS; SUBCUTANEOUS at 06:02

## 2018-02-12 RX ADMIN — ACETAMINOPHEN 650 MG: 325 TABLET, FILM COATED ORAL at 12:02

## 2018-02-12 RX ADMIN — INSULIN ASPART 2 UNITS: 100 INJECTION, SOLUTION INTRAVENOUS; SUBCUTANEOUS at 08:02

## 2018-02-12 NOTE — PLAN OF CARE
Problem: Patient Care Overview  Goal: Plan of Care Review  Outcome: Ongoing (interventions implemented as appropriate)  Patient aao x4 and voiding w/o difficulty.  BCG monitoring and insulin administration as ordered.  No complaints of pain.  Educated on BCG monitoring, insulin administration, and DM diet. Telemetry discontinued. Will continue to monitor.

## 2018-02-12 NOTE — NURSING
subque injection demonstrated by patient and significant other. Both did return demonstration with no questions.

## 2018-02-12 NOTE — PLAN OF CARE
"TN to patient's room to discuss Helping the patient manage care at home.   TN/SW role explained to pt.  "Discharge planning begins on Admission" pamphlet discussed and placed in "My Health Packet" and placed at bedside.     Preferred Appointment time:  Am (Patient agrees to [facility])    Preferred pharmacy: [facility] in [place]       02/11/18 8310   Discharge Assessment   Assessment Type Discharge Planning Assessment   Confirmed/corrected address and phone number on facesheet? Yes   Assessment information obtained from? Patient   Expected Length of Stay (days) 3   Communicated expected length of stay with patient/caregiver yes   Prior to hospitilization cognitive status: Alert/Oriented   Prior to hospitalization functional status: Independent   Current cognitive status: Alert/Oriented   Current Functional Status: Independent   Lives With spouse   Able to Return to Prior Arrangements yes   Is patient able to care for self after discharge? Yes   Who are your caregiver(s) and their phone number(s)? Wife able to help at home   Patient's perception of discharge disposition home or selfcare   Readmission Within The Last 30 Days no previous admission in last 30 days   Patient currently being followed by outpatient case management? No   Patient currently receives any other outside agency services? No   Equipment Currently Used at Home none   Do you have any problems affording any of your prescribed medications? No   Is the patient taking medications as prescribed? yes   Does the patient have transportation home? Yes   Transportation Available family or friend will provide   Does the patient receive services at the Coumadin Clinic? No   Discharge Plan A Home with family   Patient/Family In Agreement With Plan yes     "

## 2018-02-12 NOTE — PROGRESS NOTES
Patient's insulin is $0 copayment per pharmacist at [facility].  Patient will f/u with [facility].  Information placed on AVS.  Una Holt LMSW, ACNAVA-DAPHNE, CCM  2/12/2018

## 2018-02-12 NOTE — PLAN OF CARE
Problem: Patient Care Overview  Goal: Plan of Care Review  Outcome: Ongoing (interventions implemented as appropriate)  Patient blood glucose was 334 coverage with novolog insulin, patient selected the amount of insulin from the sliding scale chart, dialed the desired units and injected himself with the insulin. Patient verbalized understanding of a carbohydrate diet and the need to refrain from refined sugars. Continue with plan of care and continue to monitor patient.

## 2018-02-12 NOTE — PROGRESS NOTES
[id number] - [place] [address] [facility] [address]  [address] [address]  Phone: [phone] Fax: [phone]    Rx for insulin faxed to patient's pharmacy.    Una Holt LMSW, BERNIE-DAPHNE, [facility]  2/12/2018

## 2018-02-13 NOTE — PLAN OF CARE
02/12/18 1802   Final Note   Assessment Type Final Discharge Note   Discharge Disposition Home   Hospital Follow Up  Appt(s) scheduled? Yes   Discharge plans and expectations educations in teach back method with documentation complete? Yes   Right Care Referral Info   Post Acute Recommendation No Care    has arranged all post discharge services, e.g., appts.has been delivered. Patient education sheet on DKA reviewed with patient and spouse.  Nurse completed Diabetic Education.  OK for patient to d/c from Case Management standpoint.  Una Holt LMSW, ACNAVA-Tiffany, CCM  2/12/2018

## 2018-02-13 NOTE — PLAN OF CARE
Problem: Patient Care Overview  Goal: Plan of Care Review  02/12/2018    Recommendations    Recommendation/Intervention: 1) 1800 calorie Diabetic, Cardiac Diet 2) Provided diet education with explanation, handouts, teach back methods. 3) Answered patient's questions and concerns 4) Provided RD contact information 5) Monitor oral intake 6) Monitor weight    Goals: 1) Promote weight loss 2) Patient to achieve tighter blood glucose levels  Nutrition Goal Status: new  Communication of RD Recs: other (comment) (care plan/notes)    Anais Metzger, MPH, RD, LDN

## 2018-02-13 NOTE — PLAN OF CARE
Problem: Patient Care Overview  Goal: Plan of Care Review  Outcome: Ongoing (interventions implemented as appropriate)  Patient aao x4 and voiding w/o difficulty.  BCG monitoring and insulin administration as ordered.  No complaints of pain. Patient administered insulin injections successfully. Educated on BCG monitoring, insulin administration, and DM diet. IV discontinued. Family and spouse at bedside. Will continue to monitor.

## 2018-02-13 NOTE — PROGRESS NOTES
"[facility]  Adult Nutrition  Progress Note    SUMMARY     Recommendations    Recommendation/Intervention: 1) 1800 calorie Diabetic, Cardiac Diet 2) Provided diet education with explanation, handouts, teach back methods. 3) Answered patient's questions and concerns 4) Provided RD contact information 5) Monitor oral intake 6) Monitor weight    Goals: 1) Promote weight loss 2) Patient to achieve tighter blood glucose levels  Nutrition Goal Status: new  Communication of RD Recs: other (comment) (care plan/notes)    Continuum of Care Plan     Patient to discharge on a diabetic, cardiac diet.     Reason for Assessment    Reason for Assessment: RD follow-up  Diagnosis: diabetes diagnosis/complications  General Information Comments: Patient consuming 100% of meals with tolerance. Educated patient and family on Cardiac, Diabetic Diet. Verbalized understanding.         Nutrition Prescription Ordered    Current Diet Order: 2000 calorie Diabetic Diet    Evaluation of Received Nutrients/Fluid Intake    Energy Calories Required: meeting needs  Protein Required: meeting needs    I/O: 1188/output not recorded    Fluid Required: meeting needs     Tolerance: tolerating  % Intake of Estimated Energy Needs: 75 - 100 %  % Meal Intake: 100%     Nutrition Risk Screen     Nutrition Risk Screen: no indicators present    Nutrition/Diet History    Patient Reported Diet/Restrictions/Preferences: general     Food Preferences: No cultural, Roman Catholic or ethnic food preferences.     Labs/Tests/Procedures/Meds       Pertinent Labs Reviewed: reviewed, pertinent  Pertinent Labs Comments: Hemoglobin A1C 11%    Recent Labs  Lab 02/12/18  1725   POCTGLUCOSE 240*       Pertinent Medications Reviewed: reviewed  Pertinent Medications Comments: insulin, statin    Physical Findings    Overall Physical Appearance: obese     Oral/Mouth Cavity: WDL  Skin: intact (Asim Score 23)    Anthropometrics    Temp: 97.7 °F (36.5 °C)     Height: 5' 6" " (167.6 cm)  Weight Method: Bed Scale  Weight: 121.6 kg (268 lb 1.3 oz)  Ideal Body Weight (IBW), Male: 142 lb     % Ideal Body Weight, Male (lb): 188.79 lb     BMI (Calculated): 43.4  BMI Grade: greater than 40 - morbid obesity    Estimated/Assessed Needs    Weight Used For Calorie Calculations: 121.3 kg (267 lb 6.7 oz)      Energy Calorie Requirements (kcal): 1954  Energy Need Method: kcal/kg ( 1702 - 1945)  RMR (Caswell-St. Jeor Equation): 2045.75  Weight Used For Protein Calculations: 64.4 kg (142 lb) (IBW)  Protein Requirements:  gm/d (1.5-1.7 gm/kg IBW)  Fluid Need Method: RDA Method or per MD     RDA Method (mL): 1954    CHO Requirement: 225 g     Assessment and Plan    Morbid obesity with BMI of 40.0-44.9, adult    Related to (etiology):   Excessive caloric intake    Signs and Symptoms (as evidenced by):   BMI = 43.4 kg/m2     Interventions/Recommendations (treatment strategy):  1) 1800 calorie Diabetic, Cardiac Diet 2) Provided diet education with explanation, handouts, teach back methods. 3) Answered patient's questions and concerns 4) Provided RD contact information 5) Monitor oral intake 6) Monitor weight    Nutrition Diagnosis Status:   New              Monitor and Evaluation    Food and Nutrient Intake: energy intake, food and beverage intake  Food and Nutrient Adminstration: diet order  Knowledge/Beliefs/Attitudes: food and nutrition knowledge/skill, beliefs and attitudes  Physical Activity and Function: nutrition-related ADLs and IADLs  Anthropometric Measurements: weight, weight change  Biochemical Data, Medical Tests and Procedures: electrolyte and renal panel, gastrointestinal profile, glucose/endocrine profile, inflammatory profile  Nutrition-Focused Physical Findings: overall appearance    Nutrition Risk    Level of Risk: other (see comments) (f/u 1x weekly)    Nutrition Follow-Up    RD Follow-up?: Yes

## 2018-02-27 NOTE — DISCHARGE SUMMARY
"[facility] Hospital Medicine  Discharge Summary      Patient Name: Archana Covington  MRN: [id number]  Admission Date: 2/10/2018  Hospital Length of Stay: 2 days  Discharge Date and Time: 2/12/2018  Attending Physician: Denise Henderson   Discharging Provider: Denise Henderson MD  Primary Care Provider: Primary Doctor No      HPI:   Mr. Archana Covington is a 42 y.o. man who presents from [facility] ED for DKA. Presented there with "not feeling good," polyuria, polydipsia that has been going on for "a while." No known medical diagnoses but does not see MD/PCP. No specific complaints.     * No surgery found *      Hospital Course:   Admitted with DKA, new diagnosis Dm. Managed with IVF and insulin gtt. Transitioned to NPH insulin on 2/11 (chose NPH because patient has Medicaid). Transferred to floor for continued glucose monitoring and DM teaching.     2/12- A1c 11%.  Pt feels comfortable with insulin management and given diabetes education. Pt seen by dietician as well. Insulin co-pay will be $0.  He will follow up with [facility] for diabetes monitoring and management .      Consults:   Consults         Status Ordering Provider     Inpatient consult to Registered Dietitian/Nutritionist  Once     Provider:  (Not yet assigned)    Completed NARCISA SOLANO          No new Assessment & Plan notes have been filed under this hospital service since the last note was generated.  Service: Hospital Medicine    Final Active Diagnoses:    Diagnosis Date Noted POA    PRINCIPAL PROBLEM:  New onset type 2 diabetes mellitus [E11.9] 02/10/2018 Yes    Morbid obesity with BMI of 40.0-44.9, adult [E66.01, Z68.41] 02/12/2018 Not Applicable    Mixed hyperlipidemia [E78.2] 02/11/2018 Yes    Tobacco use disorder [F17.200] 02/10/2018 Yes      Problems Resolved During this Admission:    Diagnosis Date Noted Date Resolved POA    DKA (diabetic ketoacidoses) [E13.10] 02/10/2018 02/12/2018 Yes    Hypercalcemia " [E83.52] 02/10/2018 02/12/2018 Yes    SARIAH (acute kidney injury) [N17.9] 02/10/2018 02/12/2018 Yes       Discharged Condition: good    Disposition: Home or Self Care    Follow Up:  Follow-up Information     Napoleon Godinez MD On 2/15/2018.    Specialty:  Internal Medicine  Why:  Appointment scheduled for February 15th at 9:40am  Contact information:  [address] [facility] [address]  [phone]                 Patient Instructions:     Diet Cardiac     Diet diabetic     Activity as tolerated     Notify your health care provider if you experience any of the following:  persistent dizziness, light-headedness, or visual disturbances     Notify your health care provider if you experience any of the following:  severe persistent headache     Notify your health care provider if you experience any of the following:  severe uncontrolled pain         Pending Diagnostic Studies:     None         Medications:  Reconciled Home Medications:   Discharge Medication List as of 2/12/2018  7:09 PM      START taking these medications    Details   aspirin (ECOTRIN) 81 MG EC tablet Take 1 tablet (81 mg total) by mouth once daily., Starting Tue 2/13/2018, Until Wed 2/13/2019, OTC      insulin lispro (HUMALOG) 100 unit/mL injection Per sliding scale:  Blood Glucose  mg/dL                  Pre-meal                Bedtime  151-200                0 unit                      0 unit  201-250                2 units                    1 unit  251-300                3 units                     1 unit  301-350                4 units                    2 units  >350                     5 units                    3 units, Print      nicotine (NICODERM CQ) 21 mg/24 hr Place 1 patch onto the skin once daily., Starting Tue 2/13/2018, OTC         CONTINUE these medications which have CHANGED    Details   atorvastatin (LIPITOR) 80 MG tablet Take 1 tablet (80 mg total) by mouth once daily., Starting Tue  2/13/2018, Until Wed 2/13/2019, Print      insulin NPH (NOVOLIN N) 100 unit/mL injection Inject 30 Units into the skin 2 (two) times daily before meals., Starting Mon 2/12/2018, Until Tue 2/12/2019, Print      traMADol (ULTRAM) 50 mg tablet Take 1 tablet (50 mg total) by mouth every 6 (six) hours as needed for Pain., Starting Mon 2/12/2018, Print         STOP taking these medications       erythromycin (ROMYCIN) ophthalmic ointment Comments:   Reason for Stopping:               Indwelling Lines/Drains at time of discharge:   Lines/Drains/Airways          No matching active lines, drains, or airways          Time spent on the discharge of patient: 35 minutes  Patient was seen and examined on the date of discharge and determined to be suitable for discharge.         Denise Henderson MD  Department of Hospital Medicine  [facility]

## 2018-08-20 ENCOUNTER — HOSPITAL ENCOUNTER (EMERGENCY)
Facility: HOSPITAL | Age: 43
Discharge: HOME | End: 2018-08-20
Attending: EMERGENCY MEDICINE
Payer: MEDICAID

## 2018-08-20 VITALS
RESPIRATION RATE: 18 BRPM | HEIGHT: 67 IN | BODY MASS INDEX: 43.95 KG/M2 | DIASTOLIC BLOOD PRESSURE: 88 MMHG | HEART RATE: 80 BPM | TEMPERATURE: 98 F | SYSTOLIC BLOOD PRESSURE: 134 MMHG | OXYGEN SATURATION: 99 % | WEIGHT: 280 LBS

## 2018-08-20 DIAGNOSIS — M54.12 CERVICAL RADICULOPATHY AT C5: Primary | ICD-10-CM

## 2018-08-20 LAB — POCT GLUCOSE: 131 MG/DL (ref 70–110)

## 2018-08-20 PROCEDURE — 99284 EMERGENCY DEPT VISIT MOD MDM: CPT

## 2018-08-20 RX ORDER — PREDNISONE 10 MG/1
10 TABLET ORAL DAILY
Qty: 5 TABLET | Refills: 0 | Status: SHIPPED | OUTPATIENT
Start: 2018-08-20 | End: 2018-08-20 | Stop reason: SDUPTHER

## 2018-08-20 RX ORDER — METHOCARBAMOL 500 MG/1
1000 TABLET, FILM COATED ORAL 3 TIMES DAILY
Qty: 15 TABLET | Refills: 0 | Status: SHIPPED | OUTPATIENT
Start: 2018-08-20 | End: 2018-08-25

## 2018-08-20 RX ORDER — TRAMADOL HYDROCHLORIDE 50 MG/1
50 TABLET ORAL EVERY 6 HOURS PRN
Qty: 12 TABLET | Refills: 0 | Status: SHIPPED | OUTPATIENT
Start: 2018-08-20 | End: 2018-08-20 | Stop reason: SDUPTHER

## 2018-08-20 RX ORDER — TRAMADOL HYDROCHLORIDE 50 MG/1
50 TABLET ORAL EVERY 6 HOURS PRN
Qty: 12 TABLET | Refills: 0 | Status: SHIPPED | OUTPATIENT
Start: 2018-08-20 | End: 2018-08-30

## 2018-08-20 RX ORDER — METHOCARBAMOL 500 MG/1
1000 TABLET, FILM COATED ORAL 3 TIMES DAILY
Qty: 15 TABLET | Refills: 0 | Status: SHIPPED | OUTPATIENT
Start: 2018-08-20 | End: 2018-08-20 | Stop reason: SDUPTHER

## 2018-08-20 RX ORDER — PREDNISONE 10 MG/1
10 TABLET ORAL DAILY
Qty: 5 TABLET | Refills: 0 | Status: SHIPPED | OUTPATIENT
Start: 2018-08-20 | End: 2018-08-25

## 2018-08-21 NOTE — ED PROVIDER NOTES
Encounter Date: 2018    SCRIBE #1 NOTE: I, Jewell Rodriges, am scribing for, and in the presence of,  Dr. Shea. I have scribed the following portions of the note - Other sections scribed: HPI, ROS, PE.       History     Chief Complaint   Patient presents with    Neck Pain     Neck pain, left shoulder pain and left arm pain x 3 days. Denies any SOB, CP, Diaphoresis. Denies Cardiac hx, Denies injury     This is a 42 year old male who presents with constant left neck pain radiating down his left shoulder for three days. Denies injury or trauma.       The history is provided by the patient.   Neck Pain    This is a new problem. Illness onset: 3 days. The problem occurs constantly. The problem has been unchanged. The pain is associated with nothing. The pain is present in the left side. The pain radiates to the left scapula. Pertinent negatives include no chest pain, no numbness and no headaches. He has tried nothing for the symptoms.     Review of patient's allergies indicates:   Allergen Reactions    Iodinated contrast- oral and iv dye Hives     Past Medical History:   Diagnosis Date    Diabetes mellitus     Hyperlipidemia      History reviewed. No pertinent surgical history.  Family History   Problem Relation Age of Onset    Cancer Mother 55        lymphoma    Heart disease Mother     Diabetes Mother     Hypertension Father      Social History     Tobacco Use    Smoking status: Former Smoker     Packs/day: 1.00     Years: 23.00     Pack years: 23.00     Types: Cigarettes     Last attempt to quit: 2018     Years since quittin.1    Smokeless tobacco: Never Used    Tobacco comment: Pt states he smokes 1 pack of cigarettes per week.   Substance Use Topics    Alcohol use: No    Drug use: No     Review of Systems   Constitutional: Negative.  Negative for fever.   HENT: Negative.  Negative for sore throat.    Eyes: Negative.  Negative for visual disturbance.   Respiratory: Negative.     Cardiovascular: Negative.  Negative for chest pain.   Gastrointestinal: Negative.  Negative for abdominal pain and vomiting.   Endocrine: Negative.    Genitourinary: Negative.  Negative for dysuria.   Musculoskeletal: Positive for neck pain. Negative for myalgias.   Skin: Negative.  Negative for rash.   Allergic/Immunologic: Negative.    Neurological: Negative.  Negative for numbness and headaches.   Hematological: Negative.  Negative for adenopathy.   Psychiatric/Behavioral: Negative.  Negative for behavioral problems.   All other systems reviewed and are negative.      Physical Exam     Initial Vitals [08/20/18 1917]   BP Pulse Resp Temp SpO2   (!) 140/85 80 18 98 °F (36.7 °C) 99 %      MAP       --         Physical Exam    Nursing note and vitals reviewed.  Constitutional: Vital signs are normal. He appears well-developed and well-nourished.   HENT:   Head: Normocephalic and atraumatic.   Right Ear: External ear normal.   Left Ear: External ear normal.   Nose: Nose normal.   Eyes: Conjunctivae and EOM are normal. Pupils are equal, round, and reactive to light.   Neck: Trachea normal and normal range of motion. Neck supple. Muscular tenderness present. No spinous process tenderness present. No edema, no erythema and normal range of motion present. No neck rigidity. No JVD present.       Cardiovascular: Normal rate and intact distal pulses.   Pulmonary/Chest: No respiratory distress.   Musculoskeletal: Normal range of motion.        Left shoulder: He exhibits pain and spasm.   Unilateral left trapezius spasm.  sensory C5 paresthesias.   Neurological: He is alert and oriented to person, place, and time. He has normal strength. No cranial nerve deficit or sensory deficit. GCS eye subscore is 4. GCS verbal subscore is 5. GCS motor subscore is 6.   Skin: Skin is warm and dry. Capillary refill takes less than 2 seconds. No rash noted.   Psychiatric: He has a normal mood and affect. His speech is normal and behavior is  normal.         ED Course   Procedures  Labs Reviewed   POCT GLUCOSE - Abnormal; Notable for the following components:       Result Value    POCT Glucose 131 (*)     All other components within normal limits          Imaging Results    None                     Scribe Attestation:   Scribe #1: I performed the above scribed service and the documentation accurately describes the services I performed. I attest to the accuracy of the note.    This document was produced by a scribe under my direction and in my presence. I agree with the content of the note and have made any necessary edits.     Jack Shea MD    08/20/2018 8:05 PM           Clinical Impression:     1. Cervical radiculopathy at C5                                   Jack Shea MD  08/24/18 [id number]

## 2018-08-22 ENCOUNTER — HOSPITAL ENCOUNTER (EMERGENCY)
Facility: HOSPITAL | Age: 43
Discharge: HOME | End: 2018-08-22
Attending: INTERNAL MEDICINE
Payer: MEDICAID

## 2018-08-22 VITALS
SYSTOLIC BLOOD PRESSURE: 132 MMHG | TEMPERATURE: 98 F | WEIGHT: 280 LBS | HEART RATE: 88 BPM | RESPIRATION RATE: 16 BRPM | HEIGHT: 67 IN | OXYGEN SATURATION: 97 % | DIASTOLIC BLOOD PRESSURE: 94 MMHG | BODY MASS INDEX: 43.95 KG/M2

## 2018-08-22 DIAGNOSIS — T14.8XXA MUSCLE STRAIN: Primary | ICD-10-CM

## 2018-08-22 DIAGNOSIS — S16.1XXA NECK MUSCLE STRAIN, INITIAL ENCOUNTER: ICD-10-CM

## 2018-08-22 PROCEDURE — 99283 EMERGENCY DEPT VISIT LOW MDM: CPT

## 2018-08-22 RX ORDER — IBUPROFEN 800 MG/1
800 TABLET ORAL EVERY 8 HOURS PRN
Qty: 30 TABLET | Refills: 0 | Status: SHIPPED | OUTPATIENT
Start: 2018-08-22

## 2018-08-23 NOTE — ED PROVIDER NOTES
Encounter Date: 2018    SCRIBE #1 NOTE: I, Mayra Gonzalez, am scribing for, and in the presence of,  Dr. Simon. I have scribed the following portions of the note - Other sections scribed: HPI, ROS, PE.       History     Chief Complaint   Patient presents with    Arm Pain     pt presents with c/o pain to E x4 days; pt reports injury occured from heavy lifting; pt was seen in this ED yesterday for same complaint; pt states Rx meds not helping to control pain; reports pain radiates to neck     41 y/o male presents to ED c/o left arm pain. He reports putting a piece of heavy equipment in the back of a truck and has had arm pain ever since. He presented to this ED yesterday and was treated with robaxin, prednisone and tramadol. He came in today due to the medication not helping with his pain.      The history is provided by the patient.   Arm Pain   This is a new problem. The current episode started more than 2 days ago. The problem has not changed since onset.Pertinent negatives include no chest pain and no shortness of breath. The symptoms are aggravated by exertion.     Review of patient's allergies indicates:   Allergen Reactions    Iodinated contrast- oral and iv dye Hives     Past Medical History:   Diagnosis Date    Diabetes mellitus     Hyperlipidemia      No past surgical history on file.  Family History   Problem Relation Age of Onset    Cancer Mother 55        lymphoma    Heart disease Mother     Diabetes Mother     Hypertension Father      Social History     Tobacco Use    Smoking status: Former Smoker     Packs/day: 1.00     Years: 23.00     Pack years: 23.00     Types: Cigarettes     Last attempt to quit: 2018     Years since quittin.1    Smokeless tobacco: Never Used    Tobacco comment: Pt states he smokes 1 pack of cigarettes per week.   Substance Use Topics    Alcohol use: No    Drug use: No     Review of Systems   Constitutional: Negative for fever.   HENT: Negative for sore  throat.    Respiratory: Negative for shortness of breath.    Cardiovascular: Negative for chest pain.   Gastrointestinal: Negative for nausea.   Genitourinary: Negative for dysuria.   Musculoskeletal: Positive for arthralgias and myalgias. Negative for back pain.   Skin: Negative for rash.   Neurological: Negative for weakness.   Hematological: Does not bruise/bleed easily.   All other systems reviewed and are negative.      Physical Exam     Initial Vitals [08/22/18 1941]   BP Pulse Resp Temp SpO2   (!) 132/94 88 16 98.2 °F (36.8 °C) 97 %      MAP       --         Physical Exam    Nursing note and vitals reviewed.  Constitutional: He appears well-developed and well-nourished. He is not diaphoretic. No distress.   HENT:   Head: Normocephalic and atraumatic.   Eyes: EOM are normal.   Cardiovascular: Normal rate, regular rhythm and normal heart sounds. Exam reveals no gallop and no friction rub.    No murmur heard.  Pulmonary/Chest: Breath sounds normal. No respiratory distress. He has no wheezes. He has no rhonchi. He has no rales.   Musculoskeletal:        Right shoulder: He exhibits pain and spasm. He exhibits no tenderness, no bony tenderness, no swelling, no effusion, no crepitus and no deformity.        Arms:  Neurological: He is alert and oriented to person, place, and time.   Skin: Skin is warm and dry.         ED Course   Procedures  Labs Reviewed - No data to display       Imaging Results    None          Medical Decision Making:   Initial Assessment:   41 y/o male presents to ED c/o left arm pain. He reports putting a piece of heavy equipment in the back of a truck and has had arm pain ever since. He presented to this ED yesterday and was treated with robaxin, prednisone and tramadol. He came in today due to the medication not helping with his pain.              Scribe Attestation:   Scribe #1: I performed the above scribed service and the documentation accurately describes the services I performed. I  attest to the accuracy of the note.    This document was produced by a scribe under my direction and in my presence. I agree with the content of the note and have made any necessary edits.     Dr. Simon    09/11/2018 8:58 AM             Clinical Impression:     1. Muscle strain    2. Neck muscle strain, initial encounter        Disposition:   Disposition: Discharged  Condition: Stable                        Bam Simon MD  09/11/18 0859

## 2018-11-30 ENCOUNTER — TELEPHONE (OUTPATIENT)
Dept: ORTHOPEDICS | Facility: CLINIC | Age: 43
End: 2018-11-30

## 2018-11-30 DIAGNOSIS — M25.512 LEFT SHOULDER PAIN: Primary | ICD-10-CM

## 2018-11-30 NOTE — TELEPHONE ENCOUNTER
Reaching out to patient to confirm xray and clinic appointment with Dr. Mclain at [facility].    Left shoulder

## 2019-01-14 ENCOUNTER — TELEPHONE (OUTPATIENT)
Dept: ORTHOPEDICS | Facility: CLINIC | Age: 44
End: 2019-01-14

## 2019-01-14 NOTE — TELEPHONE ENCOUNTER
Left message.  Attempting to reach out to patient to confirm xray and clinic appointment with Dr. Mclain at [facility].

## 2020-11-03 NOTE — ASSESSMENT & PLAN NOTE
- , HDL 27, LDL invalid due to high Tgs,   - start atorvastatin 80mg daily  - no fibrate as Tgs are <886  - start asa 81    
- Ca 10.6 in ED  - likely secondary to dehydration  - IVF as per DKA protocol  - continue to monitor    
- Ca 10.6 in ED  - likely secondary to dehydration  - IVF as per DKA protocol  - now corrected to normal    
- Cr 1.3 at Ed, no prior to compare, but this likely represents a prerenal SARIAH due to dehydration  - IVF as below  - monitor    
- Cr 1.3 at Ed, no prior to compare, but this likely represents a prerenal SARIAH due to dehydration  - IVF as below  - now improved to Cr 0.9    
- admitted with pH 7.2, bicarb 21, AG 32, UA + glucose and ketones, glucose 432  - no signs or symptoms of infection. Received ceftriaxone at ED but will discontinue  - appears hypovolemic at admit- give NS 1L bolus  - start 1/2 NS at 250, insulin gtt  - Q1 hour accuchecks  - Q4 hour BMPs  - supplement K as needed per protocol  - hypoglycemic protocol  - A1c in process    2/11- transitioned to NPH insulin 15U BID + SSI  - will need to be linked to PCP for DM care  - start asa 81 and atorvastatin 40mg  
- admitted with pH 7.2, bicarb 21, AG 32, UA + glucose and ketones, glucose 432  - no signs or symptoms of infection. Received ceftriaxone at ED but will discontinue  - appears hypovolemic at this time- give NS 1L bolus  - start 1/2 NS at 250, insulin gtt  - Q1 hour accuchecks  - Q4 hour BMPs  - supplement K as needed per protocol  - check Phos  - check A1c  - hypoglycemic protocol  - patient will need to be transitioned to insulin after- will need to choose which kind based off insurance  - will need to be linked to PCP for DM care  - check lipids  
- current 1 ppd smoker  - seems willing to quit  - nicotine patch 21mg  - link to smoking cessation at discharge  
- current 1 ppd smoker  - seems willing to quit  - nicotine patch 21mg  - link to smoking cessation at discharge  
- see above    
- see above    
Nutrition Diagnosis:  Altered nutrition related labs    Related to (etiology):   DM    Signs and Symptoms (as evidenced by):   Elevated fingersticks and DKA upon admission    Interventions/Recommendations (treatment strategy):  2000kcal DM diet    Nutrition Diagnosis Status:   New    
Related to (etiology):   Excessive caloric intake    Signs and Symptoms (as evidenced by):   BMI = 43.4 kg/m2     Interventions/Recommendations (treatment strategy):  1) 1800 calorie Diabetic, Cardiac Diet 2) Provided diet education with explanation, handouts, teach back methods. 3) Answered patient's questions and concerns 4) Provided RD contact information 5) Monitor oral intake 6) Monitor weight    Nutrition Diagnosis Status:   New    
normal

## 2022-02-28 ENCOUNTER — HOSPITAL ENCOUNTER (EMERGENCY)
Facility: HOSPITAL | Age: 47
Discharge: HOME | End: 2022-03-01
Attending: EMERGENCY MEDICINE
Payer: MEDICAID

## 2022-02-28 DIAGNOSIS — N28.1 RENAL CYST: ICD-10-CM

## 2022-02-28 DIAGNOSIS — K52.9 GASTROENTERITIS: Primary | ICD-10-CM

## 2022-02-28 LAB — POCT GLUCOSE: 130 MG/DL (ref 70–110)

## 2022-02-28 PROCEDURE — 96372 THER/PROPH/DIAG INJ SC/IM: CPT | Mod: 59,ER

## 2022-02-28 PROCEDURE — 25000003 PHARM REV CODE 250: Mod: ER | Performed by: EMERGENCY MEDICINE

## 2022-02-28 PROCEDURE — 96374 THER/PROPH/DIAG INJ IV PUSH: CPT | Mod: ER

## 2022-02-28 PROCEDURE — 82962 GLUCOSE BLOOD TEST: CPT | Mod: ER

## 2022-02-28 PROCEDURE — 63600175 PHARM REV CODE 636 W HCPCS: Mod: ER | Performed by: EMERGENCY MEDICINE

## 2022-02-28 PROCEDURE — 99284 EMERGENCY DEPT VISIT MOD MDM: CPT | Mod: 25,ER

## 2022-02-28 RX ORDER — ONDANSETRON 4 MG/1
4 TABLET, ORALLY DISINTEGRATING ORAL
Status: COMPLETED | OUTPATIENT
Start: 2022-02-28 | End: 2022-02-28

## 2022-02-28 RX ORDER — METOCLOPRAMIDE HYDROCHLORIDE 5 MG/ML
10 INJECTION INTRAMUSCULAR; INTRAVENOUS
Status: COMPLETED | OUTPATIENT
Start: 2022-02-28 | End: 2022-02-28

## 2022-02-28 RX ADMIN — ONDANSETRON 4 MG: 4 TABLET, ORALLY DISINTEGRATING ORAL at 11:02

## 2022-02-28 RX ADMIN — METOCLOPRAMIDE 10 MG: 5 INJECTION, SOLUTION INTRAMUSCULAR; INTRAVENOUS at 11:02

## 2022-03-01 VITALS
TEMPERATURE: 99 F | RESPIRATION RATE: 18 BRPM | HEART RATE: 70 BPM | HEIGHT: 67 IN | DIASTOLIC BLOOD PRESSURE: 89 MMHG | BODY MASS INDEX: 48.65 KG/M2 | WEIGHT: 310 LBS | SYSTOLIC BLOOD PRESSURE: 148 MMHG | OXYGEN SATURATION: 99 %

## 2022-03-01 LAB
ALBUMIN SERPL-MCNC: 3.6 G/DL (ref 3.3–5.5)
ALBUMIN SERPL-MCNC: 3.7 G/DL (ref 3.3–5.5)
ALP SERPL-CCNC: 78 U/L (ref 42–141)
ALP SERPL-CCNC: 81 U/L (ref 42–141)
BILIRUB SERPL-MCNC: 1 MG/DL (ref 0.2–1.6)
BILIRUB SERPL-MCNC: 1 MG/DL (ref 0.2–1.6)
BILIRUBIN, POC UA: NEGATIVE
BLOOD, POC UA: ABNORMAL
BUN SERPL-MCNC: 12 MG/DL (ref 7–22)
CALCIUM SERPL-MCNC: 9.5 MG/DL (ref 8–10.3)
CHLORIDE SERPL-SCNC: 109 MMOL/L (ref 98–108)
CLARITY, POC UA: CLEAR
COLOR, POC UA: YELLOW
CREAT SERPL-MCNC: 0.8 MG/DL (ref 0.6–1.2)
GLUCOSE SERPL-MCNC: 140 MG/DL (ref 73–118)
GLUCOSE, POC UA: NEGATIVE
KETONES, POC UA: NEGATIVE
LEUKOCYTE EST, POC UA: NEGATIVE
NITRITE, POC UA: NEGATIVE
PH UR STRIP: 6.5 [PH]
POC ALT (SGPT): 25 U/L (ref 10–47)
POC ALT (SGPT): 30 U/L (ref 10–47)
POC AMYLASE: 69 U/L (ref 14–97)
POC AST (SGOT): 27 U/L (ref 11–38)
POC AST (SGOT): 32 U/L (ref 11–38)
POC GGT: 19 U/L (ref 5–65)
POC TCO2: 28 MMOL/L (ref 18–33)
POTASSIUM BLD-SCNC: 4.6 MMOL/L (ref 3.6–5.1)
PROTEIN, POC UA: ABNORMAL
PROTEIN, POC: 7.2 G/DL (ref 6.4–8.1)
PROTEIN, POC: 7.6 G/DL (ref 6.4–8.1)
SODIUM BLD-SCNC: 145 MMOL/L (ref 128–145)
SPECIFIC GRAVITY, POC UA: >=1.03
UROBILINOGEN, POC UA: 1 E.U./DL

## 2022-03-01 PROCEDURE — 80053 COMPREHEN METABOLIC PANEL: CPT | Mod: ER

## 2022-03-01 PROCEDURE — 82150 ASSAY OF AMYLASE: CPT | Mod: ER

## 2022-03-01 PROCEDURE — 81003 URINALYSIS AUTO W/O SCOPE: CPT | Mod: ER

## 2022-03-01 PROCEDURE — 85025 COMPLETE CBC W/AUTO DIFF WBC: CPT | Mod: ER

## 2022-03-01 PROCEDURE — 63600175 PHARM REV CODE 636 W HCPCS: Mod: ER | Performed by: EMERGENCY MEDICINE

## 2022-03-01 RX ORDER — MORPHINE SULFATE 4 MG/ML
2 INJECTION, SOLUTION INTRAMUSCULAR; INTRAVENOUS
Status: COMPLETED | OUTPATIENT
Start: 2022-03-01 | End: 2022-03-01

## 2022-03-01 RX ORDER — TRAMADOL HYDROCHLORIDE 50 MG/1
50 TABLET ORAL EVERY 6 HOURS PRN
Qty: 12 TABLET | Refills: 0 | Status: SHIPPED | OUTPATIENT
Start: 2022-03-01 | End: 2022-03-11

## 2022-03-01 RX ORDER — ONDANSETRON 4 MG/1
4 TABLET, ORALLY DISINTEGRATING ORAL EVERY 6 HOURS PRN
Qty: 10 TABLET | Refills: 0 | Status: SHIPPED | OUTPATIENT
Start: 2022-03-01

## 2022-03-01 RX ORDER — METOCLOPRAMIDE 10 MG/1
10 TABLET ORAL EVERY 6 HOURS PRN
Qty: 30 TABLET | Refills: 0 | Status: SHIPPED | OUTPATIENT
Start: 2022-03-01

## 2022-03-01 RX ADMIN — MORPHINE SULFATE 2 MG: 4 INJECTION INTRAVENOUS at 01:03

## 2022-03-01 NOTE — ED PROVIDER NOTES
Encounter Date: 2/28/2022    SCRIBE #1 NOTE: I, Galileo Urbina, am scribing for, and in the presence of, Jack Shea MD.       History     Chief Complaint   Patient presents with    Abdominal Pain    Vomiting     PT C/O GENERALIZED ABD PAIN OFF AND ON FOR 2 DAYS WITH VOMITING TODAY, LAST BM YESTERDAY AND NML PER PT     46 year old male with DM and HTN who presents to the ED with complaints of intermittent generalized abdominal pain with associated nausea, vomiting, and chills. Patient reports the abdominal pain and chills began three days ago and the vomiting began today. Denies any associated constipation or diarrhea. Last normal bowel movement was yesterday. Unsure if he ate any bad food recently.No other complaints at this time.      The history is provided by the patient. No  was used.     Review of patient's allergies indicates:   Allergen Reactions    Iodinated contrast media Hives     Past Medical History:   Diagnosis Date    Diabetes mellitus     Hyperlipidemia     Sciatica      History reviewed. No pertinent surgical history.  Family History   Problem Relation Age of Onset    Cancer Mother 55        lymphoma    Heart disease Mother     Diabetes Mother     Hypertension Father      Social History     Tobacco Use    Smoking status: Former Smoker     Packs/day: 1.00     Years: 23.00     Pack years: 23.00     Types: Cigarettes     Quit date: 6/20/2018     Years since quitting: 3.6    Smokeless tobacco: Never Used    Tobacco comment: Pt states he smokes 1 pack of cigarettes per week.   Substance Use Topics    Alcohol use: No    Drug use: No     Review of Systems   Constitutional: Positive for chills.   HENT: Negative.    Eyes: Negative.    Respiratory: Negative.    Cardiovascular: Negative.    Gastrointestinal: Positive for abdominal pain and vomiting. Negative for constipation and diarrhea.   Endocrine: Negative.    Genitourinary: Negative.    Musculoskeletal: Negative.     Skin: Negative.    Allergic/Immunologic: Negative.    Neurological: Negative.    Hematological: Negative.    Psychiatric/Behavioral: Negative.    All other systems reviewed and are negative.      Physical Exam     Initial Vitals [02/28/22 2312]   BP Pulse Resp Temp SpO2   137/84 78 20 99.6 °F (37.6 °C) 97 %      MAP       --         Physical Exam    Nursing note and vitals reviewed.  Constitutional: Vital signs are normal. He appears well-developed. He is cooperative.  Non-toxic appearance. He does not appear ill.   HENT:   Head: Normocephalic and atraumatic.   Eyes: Conjunctivae are normal.   Neck:   Normal range of motion.  Cardiovascular: Normal rate and regular rhythm.   Pulmonary/Chest: Effort normal and breath sounds normal.   Musculoskeletal:      Cervical back: Normal range of motion.     Neurological: He is alert and oriented to person, place, and time. GCS eye subscore is 4. GCS verbal subscore is 5. GCS motor subscore is 6.   Skin: Skin is warm, dry and intact. No rash noted.   Psychiatric: He has a normal mood and affect. His speech is normal and behavior is normal. Thought content normal.         ED Course   Procedures  Labs Reviewed   POCT URINALYSIS W/O SCOPE - Abnormal; Notable for the following components:       Result Value    Spec Grav UA >=1.030 (*)     Blood, UA 3+ (*)     Protein, UA 2+ (*)     All other components within normal limits   POCT GLUCOSE - Abnormal; Notable for the following components:    POCT Glucose 130 (*)     All other components within normal limits   POCT CMP - Abnormal; Notable for the following components:    POC Chloride 109 (*)     POC Glucose 140 (*)     All other components within normal limits   POCT CBC   POCT URINALYSIS W/O SCOPE   POCT GLUCOSE, HAND-HELD DEVICE   POCT LIVER PANEL   POCT CMP   POCT LIVER PANEL          Imaging Results          CT Abdomen Pelvis  Without Contrast (Final result)  Result time 03/01/22 01:23:29    Final result by Manoj Garcia MD  (03/01/22 01:23:29)                 Impression:      1. No acute intra-abdominal abnormalities identified.  2. Left renal 5 cm hypodense lesion, possible cyst, however noting that this has significantly increased in size compared to previous CT (previously 2 cm) from 2018.  Consider nonemergent ultrasound follow-up to confirm cystic nature of this lesion.      Electronically signed by: Manoj Garcia MD  Date:    03/01/2022  Time:    01:23             Narrative:    EXAMINATION:  CT ABDOMEN PELVIS WITHOUT CONTRAST    CLINICAL HISTORY:  Abdominal pain, acute, nonlocalized;    TECHNIQUE:  Low dose axial images, sagittal and coronal reformations were obtained from the lung bases to the pubic symphysis.  Oral contrast was not administered.    COMPARISON:  CT abdomen and pelvis from February 2018.    FINDINGS:  The visualized portion of the heart is unremarkable.  The lung bases are clear.    No significant hepatic abnormality seen on this noncontrast exam.  There is no intra-or extrahepatic biliary ductal dilatation.  The gallbladder is unremarkable.  The stomach, pancreas, spleen, and adrenal glands are unremarkable.    Kidneys show no evidence of stones or hydronephrosis.  There is a 5 cm hypodense lesion at the inferior pole of the left kidney, possible cyst.  Ureters are unremarkable along their courses.  Urinary bladder and prostate are unremarkable.    Appendix is not visualized, however no abnormalities or inflammatory changes are seen in the region.  Few scattered colonic diverticula are seen.  The visualized loops of small and large bowel show no evidence of obstruction or inflammation.  No free air or free fluid.    Aorta tapers normally.    No acute osseous abnormality identified. Subcutaneous soft tissue structures are unremarkable.                                 Medications   ondansetron disintegrating tablet 4 mg (4 mg Oral Given 2/28/22 0248)   metoclopramide HCl injection 10 mg (10 mg Intramuscular Given  2/28/22 0824)   morphine injection 2 mg (2 mg Intravenous Given 3/1/22 0113)     Medical Decision Making:   Clinical Tests:   Lab Tests: Ordered and Reviewed          Scribe Attestation:   Scribe #1: I performed the above scribed service and the documentation accurately describes the services I performed. I attest to the accuracy of the note.                 This document was produced by a scribe under my direction and in my presence. I agree with the content of the note and have made any necessary edits.     Jack Shea MD    03/01/2022 1:54 AM      Clinical Impression:   Final diagnoses:  [K52.9] Gastroenteritis (Primary)  [N28.1] Renal cyst          ED Disposition Condition    Discharge Stable        ED Prescriptions     Medication Sig Dispense Start Date End Date Auth. Provider    ondansetron (ZOFRAN-ODT) 4 MG TbDL Take 1 tablet (4 mg total) by mouth every 6 (six) hours as needed. 10 tablet 3/1/2022  Jack Shea MD    metoclopramide HCl (REGLAN) 10 MG tablet Take 1 tablet (10 mg total) by mouth every 6 (six) hours as needed. 30 tablet 3/1/2022  Jack Shea MD    traMADoL (ULTRAM) 50 mg tablet Take 1 tablet (50 mg total) by mouth every 6 (six) hours as needed for Pain. 12 tablet 3/1/2022 3/11/2022 Jack Shea MD        Follow-up Information     Follow up With Specialties Details Why Contact Info    Your PCP  Schedule an appointment as soon as possible for a visit in 3 days             Jack Shea MD  03/01/22 0154